# Patient Record
Sex: MALE | Race: WHITE | NOT HISPANIC OR LATINO
[De-identification: names, ages, dates, MRNs, and addresses within clinical notes are randomized per-mention and may not be internally consistent; named-entity substitution may affect disease eponyms.]

---

## 2017-06-06 ENCOUNTER — RX RENEWAL (OUTPATIENT)
Age: 62
End: 2017-06-06

## 2017-07-26 ENCOUNTER — NON-APPOINTMENT (OUTPATIENT)
Age: 62
End: 2017-07-26

## 2017-07-26 ENCOUNTER — APPOINTMENT (OUTPATIENT)
Dept: FAMILY MEDICINE | Facility: CLINIC | Age: 62
End: 2017-07-26

## 2017-07-26 VITALS
WEIGHT: 218.38 LBS | SYSTOLIC BLOOD PRESSURE: 120 MMHG | BODY MASS INDEX: 31.26 KG/M2 | DIASTOLIC BLOOD PRESSURE: 80 MMHG | HEIGHT: 70 IN

## 2017-07-26 VITALS — DIASTOLIC BLOOD PRESSURE: 90 MMHG | RESPIRATION RATE: 16 BRPM | SYSTOLIC BLOOD PRESSURE: 120 MMHG | HEART RATE: 72 BPM

## 2017-07-26 DIAGNOSIS — F40.243 FEAR OF FLYING: ICD-10-CM

## 2017-07-27 LAB
ALBUMIN SERPL ELPH-MCNC: 4.2 G/DL
ALP BLD-CCNC: 74 U/L
ALT SERPL-CCNC: 7 U/L
ANION GAP SERPL CALC-SCNC: 16 MMOL/L
APPEARANCE: ABNORMAL
AST SERPL-CCNC: 17 U/L
BACTERIA: ABNORMAL
BASOPHILS # BLD AUTO: 0.04 K/UL
BASOPHILS NFR BLD AUTO: 0.4 %
BILIRUB SERPL-MCNC: 0.6 MG/DL
BILIRUBIN URINE: ABNORMAL
BLOOD URINE: ABNORMAL
BUN SERPL-MCNC: 19 MG/DL
CALCIUM OXALATE CRYSTALS: ABNORMAL
CALCIUM SERPL-MCNC: 10.1 MG/DL
CHLORIDE SERPL-SCNC: 103 MMOL/L
CHOLEST SERPL-MCNC: 127 MG/DL
CHOLEST/HDLC SERPL: 3.5 RATIO
CO2 SERPL-SCNC: 23 MMOL/L
COLOR: ABNORMAL
CREAT SERPL-MCNC: 1.18 MG/DL
EOSINOPHIL # BLD AUTO: 0.14 K/UL
EOSINOPHIL NFR BLD AUTO: 1.4 %
GLUCOSE QUALITATIVE U: NORMAL MG/DL
GLUCOSE SERPL-MCNC: 87 MG/DL
HCT VFR BLD CALC: 47.9 %
HDLC SERPL-MCNC: 36 MG/DL
HGB BLD-MCNC: 15.7 G/DL
HYALINE CASTS: 0 /LPF
IMM GRANULOCYTES NFR BLD AUTO: 0.2 %
KETONES URINE: NEGATIVE
LDLC SERPL CALC-MCNC: 67 MG/DL
LEUKOCYTE ESTERASE URINE: NEGATIVE
LYMPHOCYTES # BLD AUTO: 2.97 K/UL
LYMPHOCYTES NFR BLD AUTO: 30.7 %
MAN DIFF?: NORMAL
MCHC RBC-ENTMCNC: 30.9 PG
MCHC RBC-ENTMCNC: 32.8 GM/DL
MCV RBC AUTO: 94.3 FL
MICROSCOPIC-UA: NORMAL
MONOCYTES # BLD AUTO: 0.68 K/UL
MONOCYTES NFR BLD AUTO: 7 %
NEUTROPHILS # BLD AUTO: 5.81 K/UL
NEUTROPHILS NFR BLD AUTO: 60.3 %
NITRITE URINE: NEGATIVE
PH URINE: 5.5
PLATELET # BLD AUTO: 257 K/UL
POTASSIUM SERPL-SCNC: 4.2 MMOL/L
PROT SERPL-MCNC: 7.5 G/DL
PROTEIN URINE: 30 MG/DL
PSA SERPL-MCNC: 1.12 NG/ML
RBC # BLD: 5.08 M/UL
RBC # FLD: 13.1 %
RED BLOOD CELLS URINE: 5 /HPF
SODIUM SERPL-SCNC: 142 MMOL/L
SPECIFIC GRAVITY URINE: 1.03
SQUAMOUS EPITHELIAL CELLS: 3 /HPF
T4 SERPL-MCNC: 6.6 UG/DL
TRIGL SERPL-MCNC: 118 MG/DL
TSH SERPL-ACNC: 1.19 UIU/ML
URATE SERPL-MCNC: 4.6 MG/DL
URINE COMMENTS: NORMAL
UROBILINOGEN URINE: NORMAL MG/DL
WBC # FLD AUTO: 9.66 K/UL
WHITE BLOOD CELLS URINE: 2 /HPF

## 2018-02-07 ENCOUNTER — APPOINTMENT (OUTPATIENT)
Dept: FAMILY MEDICINE | Facility: CLINIC | Age: 63
End: 2018-02-07
Payer: COMMERCIAL

## 2018-02-07 VITALS — SYSTOLIC BLOOD PRESSURE: 90 MMHG | RESPIRATION RATE: 1 BRPM | HEART RATE: 72 BPM | DIASTOLIC BLOOD PRESSURE: 60 MMHG

## 2018-02-07 VITALS
BODY MASS INDEX: 31.21 KG/M2 | HEIGHT: 70 IN | SYSTOLIC BLOOD PRESSURE: 108 MMHG | DIASTOLIC BLOOD PRESSURE: 62 MMHG | TEMPERATURE: 100.7 F | WEIGHT: 218 LBS

## 2018-02-07 DIAGNOSIS — Z00.00 ENCOUNTER FOR GENERAL ADULT MEDICAL EXAMINATION W/OUT ABNORMAL FINDINGS: ICD-10-CM

## 2018-02-07 DIAGNOSIS — Z87.09 PERSONAL HISTORY OF OTHER DISEASES OF THE RESPIRATORY SYSTEM: ICD-10-CM

## 2018-02-07 PROCEDURE — 99214 OFFICE O/P EST MOD 30 MIN: CPT

## 2018-08-12 ENCOUNTER — FORM ENCOUNTER (OUTPATIENT)
Age: 63
End: 2018-08-12

## 2018-08-13 ENCOUNTER — APPOINTMENT (OUTPATIENT)
Dept: FAMILY MEDICINE | Facility: CLINIC | Age: 63
End: 2018-08-13
Payer: COMMERCIAL

## 2018-08-13 ENCOUNTER — RX RENEWAL (OUTPATIENT)
Age: 63
End: 2018-08-13

## 2018-08-13 ENCOUNTER — OUTPATIENT (OUTPATIENT)
Dept: OUTPATIENT SERVICES | Facility: HOSPITAL | Age: 63
LOS: 1 days | End: 2018-08-13
Payer: COMMERCIAL

## 2018-08-13 ENCOUNTER — APPOINTMENT (OUTPATIENT)
Dept: RADIOLOGY | Facility: CLINIC | Age: 63
End: 2018-08-13
Payer: COMMERCIAL

## 2018-08-13 VITALS
SYSTOLIC BLOOD PRESSURE: 120 MMHG | WEIGHT: 218 LBS | DIASTOLIC BLOOD PRESSURE: 80 MMHG | BODY MASS INDEX: 31.21 KG/M2 | HEIGHT: 70 IN

## 2018-08-13 DIAGNOSIS — Z00.8 ENCOUNTER FOR OTHER GENERAL EXAMINATION: ICD-10-CM

## 2018-08-13 DIAGNOSIS — M79.671 PAIN IN RIGHT FOOT: ICD-10-CM

## 2018-08-13 PROCEDURE — 73630 X-RAY EXAM OF FOOT: CPT

## 2018-08-13 PROCEDURE — 73610 X-RAY EXAM OF ANKLE: CPT | Mod: 26,RT

## 2018-08-13 PROCEDURE — 73610 X-RAY EXAM OF ANKLE: CPT

## 2018-08-13 PROCEDURE — 73630 X-RAY EXAM OF FOOT: CPT | Mod: 26,RT

## 2018-08-13 PROCEDURE — 99213 OFFICE O/P EST LOW 20 MIN: CPT

## 2018-09-06 ENCOUNTER — APPOINTMENT (OUTPATIENT)
Dept: FAMILY MEDICINE | Facility: CLINIC | Age: 63
End: 2018-09-06

## 2018-10-11 ENCOUNTER — RX RENEWAL (OUTPATIENT)
Age: 63
End: 2018-10-11

## 2018-11-05 ENCOUNTER — APPOINTMENT (OUTPATIENT)
Dept: FAMILY MEDICINE | Facility: CLINIC | Age: 63
End: 2018-11-05
Payer: COMMERCIAL

## 2018-11-05 VITALS — DIASTOLIC BLOOD PRESSURE: 70 MMHG | RESPIRATION RATE: 16 BRPM | SYSTOLIC BLOOD PRESSURE: 92 MMHG | HEART RATE: 72 BPM

## 2018-11-05 VITALS
HEART RATE: 90 BPM | OXYGEN SATURATION: 98 % | TEMPERATURE: 99.5 F | SYSTOLIC BLOOD PRESSURE: 100 MMHG | WEIGHT: 220 LBS | RESPIRATION RATE: 16 BRPM | HEIGHT: 70 IN | DIASTOLIC BLOOD PRESSURE: 70 MMHG | BODY MASS INDEX: 31.5 KG/M2

## 2018-11-05 DIAGNOSIS — Z87.09 PERSONAL HISTORY OF OTHER DISEASES OF THE RESPIRATORY SYSTEM: ICD-10-CM

## 2018-11-05 LAB — S PYO AG SPEC QL IA: NEGATIVE

## 2018-11-05 PROCEDURE — 87880 STREP A ASSAY W/OPTIC: CPT | Mod: QW

## 2018-11-05 PROCEDURE — 99214 OFFICE O/P EST MOD 30 MIN: CPT | Mod: 25

## 2018-11-05 RX ORDER — OSELTAMIVIR PHOSPHATE 75 MG/1
75 CAPSULE ORAL TWICE DAILY
Qty: 10 | Refills: 0 | Status: COMPLETED | COMMUNITY
Start: 2018-02-07 | End: 2018-11-05

## 2018-11-05 NOTE — ASSESSMENT
[FreeTextEntry1] : symptomatic  treatment  fluids  aspirin tylenol advill  notify office  if  symptoms gets  worse  or  temp over  102\par check labs  for htn and hld

## 2018-11-05 NOTE — PHYSICAL EXAM
[No Acute Distress] : no acute distress [PERRL] : pupils equal round and reactive to light [No Lymphadenopathy] : no lymphadenopathy [Clear to Auscultation] : lungs were clear to auscultation bilaterally [Regular Rhythm] : with a regular rhythm [No Edema] : there was no peripheral edema [Soft] : abdomen soft [Non Tender] : non-tender [No HSM] : no HSM [No Joint Swelling] : no joint swelling [No Rash] : no rash [Normal Gait] : normal gait [de-identified] : throat  red

## 2018-11-05 NOTE — HISTORY OF PRESENT ILLNESS
[Cold Symptoms] : cold symptoms [Sore Throat] : sore throat [Earache (R)] : pain in right ear [Moderate] : moderate [Constant] : constant [Improving] : improving [FreeTextEntry2] : hoarse  difficulty swallowing  [FreeTextEntry8] : taking  bp  meds  dos not  check bp but  bp at goal

## 2018-11-06 LAB
ALBUMIN SERPL ELPH-MCNC: 4 G/DL
ALP BLD-CCNC: 80 U/L
ALT SERPL-CCNC: 11 U/L
ANION GAP SERPL CALC-SCNC: 14 MMOL/L
APPEARANCE: ABNORMAL
AST SERPL-CCNC: 13 U/L
BACTERIA: NEGATIVE
BASOPHILS # BLD AUTO: 0.03 K/UL
BASOPHILS NFR BLD AUTO: 0.2 %
BILIRUB SERPL-MCNC: 0.6 MG/DL
BILIRUBIN URINE: ABNORMAL
BLOOD URINE: NEGATIVE
BUN SERPL-MCNC: 17 MG/DL
CALCIUM SERPL-MCNC: 9.9 MG/DL
CHLORIDE SERPL-SCNC: 104 MMOL/L
CHOLEST SERPL-MCNC: 124 MG/DL
CHOLEST/HDLC SERPL: 3.9 RATIO
CO2 SERPL-SCNC: 24 MMOL/L
COLOR: ABNORMAL
CREAT SERPL-MCNC: 1.11 MG/DL
EOSINOPHIL # BLD AUTO: 0.15 K/UL
EOSINOPHIL NFR BLD AUTO: 1 %
GLUCOSE QUALITATIVE U: NEGATIVE MG/DL
GLUCOSE SERPL-MCNC: 98 MG/DL
HCT VFR BLD CALC: 47.6 %
HDLC SERPL-MCNC: 32 MG/DL
HGB BLD-MCNC: 15.4 G/DL
HYALINE CASTS: 0 /LPF
IMM GRANULOCYTES NFR BLD AUTO: 0.2 %
KETONES URINE: ABNORMAL
LDLC SERPL CALC-MCNC: 61 MG/DL
LEUKOCYTE ESTERASE URINE: NEGATIVE
LYMPHOCYTES # BLD AUTO: 2.48 K/UL
LYMPHOCYTES NFR BLD AUTO: 17.2 %
MAN DIFF?: NORMAL
MCHC RBC-ENTMCNC: 30.9 PG
MCHC RBC-ENTMCNC: 32.4 GM/DL
MCV RBC AUTO: 95.6 FL
MICROSCOPIC-UA: NORMAL
MONOCYTES # BLD AUTO: 1.16 K/UL
MONOCYTES NFR BLD AUTO: 8 %
NEUTROPHILS # BLD AUTO: 10.61 K/UL
NEUTROPHILS NFR BLD AUTO: 73.4 %
NITRITE URINE: NEGATIVE
PH URINE: 5
PLATELET # BLD AUTO: 235 K/UL
POTASSIUM SERPL-SCNC: 4.4 MMOL/L
PROT SERPL-MCNC: 7.3 G/DL
PROTEIN URINE: 30 MG/DL
RBC # BLD: 4.98 M/UL
RBC # FLD: 13.9 %
RED BLOOD CELLS URINE: 4 /HPF
SODIUM SERPL-SCNC: 141 MMOL/L
SPECIFIC GRAVITY URINE: 1.03
SQUAMOUS EPITHELIAL CELLS: 2 /HPF
T4 SERPL-MCNC: 6.3 UG/DL
TRIGL SERPL-MCNC: 157 MG/DL
TSH SERPL-ACNC: 1.59 UIU/ML
URATE SERPL-MCNC: 4.3 MG/DL
UROBILINOGEN URINE: 1 MG/DL
WBC # FLD AUTO: 14.46 K/UL
WHITE BLOOD CELLS URINE: 2 /HPF

## 2019-04-02 ENCOUNTER — APPOINTMENT (OUTPATIENT)
Dept: FAMILY MEDICINE | Facility: CLINIC | Age: 64
End: 2019-04-02
Payer: COMMERCIAL

## 2019-04-02 VITALS — SYSTOLIC BLOOD PRESSURE: 120 MMHG | RESPIRATION RATE: 16 BRPM | DIASTOLIC BLOOD PRESSURE: 80 MMHG | HEART RATE: 72 BPM

## 2019-04-02 VITALS
WEIGHT: 235.5 LBS | BODY MASS INDEX: 33.71 KG/M2 | HEART RATE: 65 BPM | OXYGEN SATURATION: 96 % | DIASTOLIC BLOOD PRESSURE: 68 MMHG | HEIGHT: 70 IN | SYSTOLIC BLOOD PRESSURE: 108 MMHG

## 2019-04-02 PROCEDURE — 99213 OFFICE O/P EST LOW 20 MIN: CPT

## 2019-04-02 RX ORDER — DIAZEPAM 5 MG/1
5 TABLET ORAL
Qty: 10 | Refills: 0 | Status: COMPLETED | COMMUNITY
Start: 2017-07-26 | End: 2019-04-02

## 2019-04-02 RX ORDER — ALPRAZOLAM 0.25 MG/1
0.25 TABLET ORAL
Qty: 10 | Refills: 0 | Status: COMPLETED | COMMUNITY
Start: 2018-08-13 | End: 2019-04-02

## 2019-04-02 NOTE — HISTORY OF PRESENT ILLNESS
[FreeTextEntry1] : pt here  for management of  htn  [de-identified] : feeling well taking  all meds  not  checking  bp

## 2019-04-02 NOTE — PHYSICAL EXAM
[No Acute Distress] : no acute distress [PERRL] : pupils equal round and reactive to light [No Lymphadenopathy] : no lymphadenopathy [Clear to Auscultation] : lungs were clear to auscultation bilaterally [Regular Rhythm] : with a regular rhythm [No Edema] : there was no peripheral edema [Soft] : abdomen soft [Non Tender] : non-tender [No HSM] : no HSM [No Joint Swelling] : no joint swelling [No Rash] : no rash [Normal Gait] : normal gait [de-identified] : occ  ectopy

## 2020-04-05 ENCOUNTER — INPATIENT (INPATIENT)
Facility: HOSPITAL | Age: 65
LOS: 2 days | Discharge: HOME | DRG: 177 | End: 2020-04-08
Attending: INTERNAL MEDICINE | Admitting: INTERNAL MEDICINE
Payer: COMMERCIAL

## 2020-04-05 VITALS
DIASTOLIC BLOOD PRESSURE: 56 MMHG | TEMPERATURE: 101 F | OXYGEN SATURATION: 91 % | HEART RATE: 90 BPM | HEIGHT: 70 IN | SYSTOLIC BLOOD PRESSURE: 102 MMHG | WEIGHT: 229.94 LBS | RESPIRATION RATE: 22 BRPM

## 2020-04-05 DIAGNOSIS — U07.1 COVID-19: ICD-10-CM

## 2020-04-05 LAB
ALBUMIN SERPL ELPH-MCNC: 3.3 G/DL — SIGNIFICANT CHANGE UP (ref 3.3–5)
ALP SERPL-CCNC: 67 U/L — SIGNIFICANT CHANGE UP (ref 30–120)
ALT FLD-CCNC: 39 U/L DA — SIGNIFICANT CHANGE UP (ref 10–60)
ANION GAP SERPL CALC-SCNC: 8 MMOL/L — SIGNIFICANT CHANGE UP (ref 5–17)
AST SERPL-CCNC: 37 U/L — SIGNIFICANT CHANGE UP (ref 10–40)
BASOPHILS # BLD AUTO: 0.01 K/UL — SIGNIFICANT CHANGE UP (ref 0–0.2)
BASOPHILS NFR BLD AUTO: 0.1 % — SIGNIFICANT CHANGE UP (ref 0–2)
BILIRUB SERPL-MCNC: 0.9 MG/DL — SIGNIFICANT CHANGE UP (ref 0.2–1.2)
BUN SERPL-MCNC: 17 MG/DL — SIGNIFICANT CHANGE UP (ref 7–23)
CALCIUM SERPL-MCNC: 8.4 MG/DL — SIGNIFICANT CHANGE UP (ref 8.4–10.5)
CHLORIDE SERPL-SCNC: 96 MMOL/L — SIGNIFICANT CHANGE UP (ref 96–108)
CO2 SERPL-SCNC: 28 MMOL/L — SIGNIFICANT CHANGE UP (ref 22–31)
CREAT SERPL-MCNC: 1.05 MG/DL — SIGNIFICANT CHANGE UP (ref 0.5–1.3)
EOSINOPHIL # BLD AUTO: 0 K/UL — SIGNIFICANT CHANGE UP (ref 0–0.5)
EOSINOPHIL NFR BLD AUTO: 0 % — SIGNIFICANT CHANGE UP (ref 0–6)
GLUCOSE SERPL-MCNC: 119 MG/DL — HIGH (ref 70–99)
HCT VFR BLD CALC: 47.7 % — SIGNIFICANT CHANGE UP (ref 39–50)
HGB BLD-MCNC: 16 G/DL — SIGNIFICANT CHANGE UP (ref 13–17)
IMM GRANULOCYTES NFR BLD AUTO: 0.6 % — SIGNIFICANT CHANGE UP (ref 0–1.5)
LIDOCAIN IGE QN: 232 U/L — SIGNIFICANT CHANGE UP (ref 73–393)
LYMPHOCYTES # BLD AUTO: 0.83 K/UL — LOW (ref 1–3.3)
LYMPHOCYTES # BLD AUTO: 9.7 % — LOW (ref 13–44)
MCHC RBC-ENTMCNC: 30.6 PG — SIGNIFICANT CHANGE UP (ref 27–34)
MCHC RBC-ENTMCNC: 33.5 GM/DL — SIGNIFICANT CHANGE UP (ref 32–36)
MCV RBC AUTO: 91.2 FL — SIGNIFICANT CHANGE UP (ref 80–100)
MONOCYTES # BLD AUTO: 0.41 K/UL — SIGNIFICANT CHANGE UP (ref 0–0.9)
MONOCYTES NFR BLD AUTO: 4.8 % — SIGNIFICANT CHANGE UP (ref 2–14)
NEUTROPHILS # BLD AUTO: 7.22 K/UL — SIGNIFICANT CHANGE UP (ref 1.8–7.4)
NEUTROPHILS NFR BLD AUTO: 84.8 % — HIGH (ref 43–77)
NRBC # BLD: 0 /100 WBCS — SIGNIFICANT CHANGE UP (ref 0–0)
PLATELET # BLD AUTO: 179 K/UL — SIGNIFICANT CHANGE UP (ref 150–400)
POTASSIUM SERPL-MCNC: 3.5 MMOL/L — SIGNIFICANT CHANGE UP (ref 3.5–5.3)
POTASSIUM SERPL-SCNC: 3.5 MMOL/L — SIGNIFICANT CHANGE UP (ref 3.5–5.3)
PROT SERPL-MCNC: 7.1 G/DL — SIGNIFICANT CHANGE UP (ref 6–8.3)
RBC # BLD: 5.23 M/UL — SIGNIFICANT CHANGE UP (ref 4.2–5.8)
RBC # FLD: 12.5 % — SIGNIFICANT CHANGE UP (ref 10.3–14.5)
SODIUM SERPL-SCNC: 132 MMOL/L — LOW (ref 135–145)
WBC # BLD: 8.52 K/UL — SIGNIFICANT CHANGE UP (ref 3.8–10.5)
WBC # FLD AUTO: 8.52 K/UL — SIGNIFICANT CHANGE UP (ref 3.8–10.5)

## 2020-04-05 PROCEDURE — 99285 EMERGENCY DEPT VISIT HI MDM: CPT

## 2020-04-05 PROCEDURE — 71045 X-RAY EXAM CHEST 1 VIEW: CPT | Mod: 26

## 2020-04-05 RX ORDER — ONDANSETRON 8 MG/1
4 TABLET, FILM COATED ORAL EVERY 6 HOURS
Refills: 0 | Status: DISCONTINUED | OUTPATIENT
Start: 2020-04-05 | End: 2020-04-08

## 2020-04-05 RX ORDER — ACETAMINOPHEN 500 MG
650 TABLET ORAL ONCE
Refills: 0 | Status: COMPLETED | OUTPATIENT
Start: 2020-04-05 | End: 2020-04-05

## 2020-04-05 RX ORDER — ONDANSETRON 8 MG/1
4 TABLET, FILM COATED ORAL ONCE
Refills: 0 | Status: COMPLETED | OUTPATIENT
Start: 2020-04-05 | End: 2020-04-05

## 2020-04-05 RX ORDER — ASCORBIC ACID 60 MG
1000 TABLET,CHEWABLE ORAL DAILY
Refills: 0 | Status: DISCONTINUED | OUTPATIENT
Start: 2020-04-05 | End: 2020-04-08

## 2020-04-05 RX ORDER — METOPROLOL TARTRATE 50 MG
25 TABLET ORAL
Refills: 0 | Status: DISCONTINUED | OUTPATIENT
Start: 2020-04-05 | End: 2020-04-06

## 2020-04-05 RX ORDER — ASPIRIN/CALCIUM CARB/MAGNESIUM 324 MG
1 TABLET ORAL
Qty: 0 | Refills: 0 | DISCHARGE

## 2020-04-05 RX ORDER — HYDROXYCHLOROQUINE SULFATE 200 MG
400 TABLET ORAL EVERY 12 HOURS
Refills: 0 | Status: COMPLETED | OUTPATIENT
Start: 2020-04-06 | End: 2020-04-06

## 2020-04-05 RX ORDER — HEPARIN SODIUM 5000 [USP'U]/ML
5000 INJECTION INTRAVENOUS; SUBCUTANEOUS EVERY 8 HOURS
Refills: 0 | Status: DISCONTINUED | OUTPATIENT
Start: 2020-04-05 | End: 2020-04-06

## 2020-04-05 RX ORDER — METOPROLOL TARTRATE 50 MG
1 TABLET ORAL
Qty: 0 | Refills: 0 | DISCHARGE

## 2020-04-05 RX ORDER — HYDROXYCHLOROQUINE SULFATE 200 MG
200 TABLET ORAL EVERY 12 HOURS
Refills: 0 | Status: DISCONTINUED | OUTPATIENT
Start: 2020-04-07 | End: 2020-04-08

## 2020-04-05 RX ORDER — ATORVASTATIN CALCIUM 80 MG/1
20 TABLET, FILM COATED ORAL AT BEDTIME
Refills: 0 | Status: DISCONTINUED | OUTPATIENT
Start: 2020-04-05 | End: 2020-04-08

## 2020-04-05 RX ORDER — SODIUM CHLORIDE 9 MG/ML
1000 INJECTION INTRAMUSCULAR; INTRAVENOUS; SUBCUTANEOUS ONCE
Refills: 0 | Status: COMPLETED | OUTPATIENT
Start: 2020-04-05 | End: 2020-04-05

## 2020-04-05 RX ORDER — LOVASTATIN 20 MG
2 TABLET ORAL
Qty: 0 | Refills: 0 | DISCHARGE

## 2020-04-05 RX ORDER — ACETAMINOPHEN 500 MG
650 TABLET ORAL EVERY 6 HOURS
Refills: 0 | Status: DISCONTINUED | OUTPATIENT
Start: 2020-04-05 | End: 2020-04-08

## 2020-04-05 RX ORDER — ASPIRIN/CALCIUM CARB/MAGNESIUM 324 MG
81 TABLET ORAL DAILY
Refills: 0 | Status: DISCONTINUED | OUTPATIENT
Start: 2020-04-05 | End: 2020-04-08

## 2020-04-05 RX ORDER — HYDROXYCHLOROQUINE SULFATE 200 MG
TABLET ORAL
Refills: 0 | Status: DISCONTINUED | OUTPATIENT
Start: 2020-04-05 | End: 2020-04-08

## 2020-04-05 RX ADMIN — Medication 650 MILLIGRAM(S): at 18:51

## 2020-04-05 RX ADMIN — SODIUM CHLORIDE 1000 MILLILITER(S): 9 INJECTION INTRAMUSCULAR; INTRAVENOUS; SUBCUTANEOUS at 19:30

## 2020-04-05 RX ADMIN — ONDANSETRON 4 MILLIGRAM(S): 8 TABLET, FILM COATED ORAL at 18:51

## 2020-04-05 RX ADMIN — SODIUM CHLORIDE 1000 MILLILITER(S): 9 INJECTION INTRAMUSCULAR; INTRAVENOUS; SUBCUTANEOUS at 20:00

## 2020-04-05 RX ADMIN — SODIUM CHLORIDE 1000 MILLILITER(S): 9 INJECTION INTRAMUSCULAR; INTRAVENOUS; SUBCUTANEOUS at 18:52

## 2020-04-05 NOTE — ED ADULT NURSE REASSESSMENT NOTE - NS ED NURSE REASSESS COMMENT FT1
pt resting comfortably, on RA, spo2 92-94% , c/o generalized weakness, IVF in progress, pending disposition. Tobacco Use- non smoker  Alcohol- Drinks vodka regularly, recently been drinking 5-10 glasses every other day  Drug Abuse- none

## 2020-04-05 NOTE — ED PROVIDER NOTE - PROGRESS NOTE DETAILS
pt still receiving fluids, over 1L still to go, will reassess after that, expect discharge, pt maintaining O2Sat mid 90s on room air pt still receiving fluids, over 1L still to go, will reassess after that, expect discharge, pt maintaining O2Sat mid 90s on room air at rest after fluids walked patient to reassess - pt almost fell, feeling very weak still, O2 to 85% after walking - pt agrees to admisison

## 2020-04-05 NOTE — ED ADULT NURSE NOTE - OBJECTIVE STATEMENT
65 y/o male bibems aox3 via stretcher c/o generalized weakness and dyspnea on exertion. pt states he was tested positive for covid19 on 3/28 and has been feeling worse over the last 3 days. pt reports nausea and dry heaving but no vomiting.

## 2020-04-05 NOTE — ED PROVIDER NOTE - CARE PLAN
Principal Discharge DX:	Infection due to [facility] coronavirus  Secondary Diagnosis:	Fever  Secondary Diagnosis:	SOB (shortness of breath) Principal Discharge DX:	Infection due to [facility] coronavirus  Secondary Diagnosis:	Fever  Secondary Diagnosis:	SOB (shortness of breath)  Secondary Diagnosis:	Hypoxia

## 2020-04-05 NOTE — CONSULT NOTE ADULT - ASSESSMENT
63 y/o male with PMH of HTN, HLD came to the ED complaining of difficulty breathing and fatigue. Patient said his symptoms has been going on for 21 days now, he was seen at [facility] MD was tested for CoVID-19, got a call on 3/26/2020 that he was positive. He has been self isolating since then. He noted still having fever, nausea, fatigue and decrease appetite. Shortness of breath is mainly with exertion.    In the ED, he received 2L of IVF, febrile and hypoxic to 85% RA with ambulation. CXR: b/l LL opacities.       Hypoxia 2/2 CoVID-19 pneumonia   Admit to medical floor with    ESR, CRP, LDH, ferritin ordered   CoVID-19 positive at outside facility   Will start Plaquenil   Tylenol PRN   Vitamin C daily   Maintain isolation protocol   Prone positioning every hour for 15 minutes at least as tolerated     HTN/HLD   Metoprolol 25mg bid with holding parameters   Lovastatin 80mg   Monitor BP     Supportive   DVT prophylaxis: heparin sc   Diet: DASH   Asprin 81mg (home medication)       Care plan discussed with Dr. Edgar

## 2020-04-05 NOTE — ED ADULT NURSE REASSESSMENT NOTE - NS ED NURSE REASSESS COMMENT FT1
pt received from previous RN, pt resting in stretcher, on RA ,spo2 93%, bed in low position, call bell within reach, plan of care discussed, will monitor.

## 2020-04-05 NOTE — ED PROVIDER NOTE - OBJECTIVE STATEMENT
65 y/o male with no pertinent PMHx HTN and high cholesterol, presents to the ED c/o fever, SOB, fatigue, decreased appetite, and fatigue for 3 weeks. Was tested on 3/26 for COVID 19 and was found to be positive. Came in today for continued sx. Denies weakness, abd pain, or chest pain. Nonsmoker. PMD: [place]. No other complaints at this time. 63 y/o male with a PMHx of HTN and high cholesterol, presents to the ED c/o fever, SOB, fatigue, decreased appetite, and fatigue for 3 weeks. Was tested on 3/26 for COVID 19 and was found to be positive. Came in today for continued sx. Denies weakness, abd pain, or chest pain. Nonsmoker. PMD: [place]. No other complaints at this time. 63 y/o male with a PMHx of HTN and high cholesterol, presents to the ED c/o fever, SOB, decreased appetite, and fatigue for 3 weeks. Was tested on 3/26 for COVID 19 and was found to be positive. Came in today for continued sx. Denies focal weakness, abd pain, diarrhea, or chest pain. Nonsmoker. PMD: [place]. No other complaints at this time.

## 2020-04-05 NOTE — ED ADULT NURSE NOTE - NSIMPLEMENTINTERV_GEN_ALL_ED
Implemented All Universal Safety Interventions:  [place] to call system. Call bell, personal items and telephone within reach. Instruct patient to call for assistance. Room bathroom lighting operational. Non-slip footwear when patient is off stretcher. Physically safe environment: no spills, clutter or unnecessary equipment. Stretcher in lowest position, wheels locked, appropriate side rails in place.

## 2020-04-05 NOTE — ED PROVIDER NOTE - CLINICAL SUMMARY MEDICAL DECISION MAKING FREE TEXT BOX
Patient who is diagnosed with COVID on 3/26. Plan: labs, chest XR, iv fluids, and zofran. Patient who is diagnosed with COVID on 3/26. Plan: labs, chest XR, iv fluids, tylenol and zofran.

## 2020-04-06 DIAGNOSIS — U07.1 COVID-19: ICD-10-CM

## 2020-04-06 DIAGNOSIS — E87.1 HYPO-OSMOLALITY AND HYPONATREMIA: ICD-10-CM

## 2020-04-06 DIAGNOSIS — R09.02 HYPOXEMIA: ICD-10-CM

## 2020-04-06 DIAGNOSIS — E78.5 HYPERLIPIDEMIA, UNSPECIFIED: ICD-10-CM

## 2020-04-06 DIAGNOSIS — Z29.9 ENCOUNTER FOR PROPHYLACTIC MEASURES, UNSPECIFIED: ICD-10-CM

## 2020-04-06 DIAGNOSIS — I10 ESSENTIAL (PRIMARY) HYPERTENSION: ICD-10-CM

## 2020-04-06 LAB
BASOPHILS # BLD AUTO: 0.02 K/UL — SIGNIFICANT CHANGE UP (ref 0–0.2)
BASOPHILS NFR BLD AUTO: 0.2 % — SIGNIFICANT CHANGE UP (ref 0–2)
CRP SERPL-MCNC: 3.13 MG/DL — HIGH (ref 0–0.4)
EOSINOPHIL # BLD AUTO: 0 K/UL — SIGNIFICANT CHANGE UP (ref 0–0.5)
EOSINOPHIL NFR BLD AUTO: 0 % — SIGNIFICANT CHANGE UP (ref 0–6)
ERYTHROCYTE [SEDIMENTATION RATE] IN BLOOD: 16 MM/HR — HIGH (ref 0–9)
FERRITIN SERPL-MCNC: 1672 NG/ML — HIGH (ref 30–400)
HCT VFR BLD CALC: 48.3 % — SIGNIFICANT CHANGE UP (ref 39–50)
HGB BLD-MCNC: 16 G/DL — SIGNIFICANT CHANGE UP (ref 13–17)
IMM GRANULOCYTES NFR BLD AUTO: 1 % — SIGNIFICANT CHANGE UP (ref 0–1.5)
LDH SERPL L TO P-CCNC: 345 U/L — HIGH (ref 50–242)
LYMPHOCYTES # BLD AUTO: 0.9 K/UL — LOW (ref 1–3.3)
LYMPHOCYTES # BLD AUTO: 10.7 % — LOW (ref 13–44)
MCHC RBC-ENTMCNC: 30.6 PG — SIGNIFICANT CHANGE UP (ref 27–34)
MCHC RBC-ENTMCNC: 33.1 GM/DL — SIGNIFICANT CHANGE UP (ref 32–36)
MCV RBC AUTO: 92.4 FL — SIGNIFICANT CHANGE UP (ref 80–100)
MONOCYTES # BLD AUTO: 0.39 K/UL — SIGNIFICANT CHANGE UP (ref 0–0.9)
MONOCYTES NFR BLD AUTO: 4.6 % — SIGNIFICANT CHANGE UP (ref 2–14)
NEUTROPHILS # BLD AUTO: 7.02 K/UL — SIGNIFICANT CHANGE UP (ref 1.8–7.4)
NEUTROPHILS NFR BLD AUTO: 83.5 % — HIGH (ref 43–77)
NRBC # BLD: 0 /100 WBCS — SIGNIFICANT CHANGE UP (ref 0–0)
PLATELET # BLD AUTO: 199 K/UL — SIGNIFICANT CHANGE UP (ref 150–400)
RBC # BLD: 5.23 M/UL — SIGNIFICANT CHANGE UP (ref 4.2–5.8)
RBC # FLD: 13 % — SIGNIFICANT CHANGE UP (ref 10.3–14.5)
WBC # BLD: 8.41 K/UL — SIGNIFICANT CHANGE UP (ref 3.8–10.5)
WBC # FLD AUTO: 8.41 K/UL — SIGNIFICANT CHANGE UP (ref 3.8–10.5)

## 2020-04-06 PROCEDURE — 99223 1ST HOSP IP/OBS HIGH 75: CPT

## 2020-04-06 RX ORDER — METOPROLOL TARTRATE 50 MG
50 TABLET ORAL
Refills: 0 | Status: DISCONTINUED | OUTPATIENT
Start: 2020-04-07 | End: 2020-04-08

## 2020-04-06 RX ORDER — ENOXAPARIN SODIUM 100 MG/ML
40 INJECTION SUBCUTANEOUS EVERY 12 HOURS
Refills: 0 | Status: DISCONTINUED | OUTPATIENT
Start: 2020-04-06 | End: 2020-04-08

## 2020-04-06 RX ADMIN — HEPARIN SODIUM 5000 UNIT(S): 5000 INJECTION INTRAVENOUS; SUBCUTANEOUS at 05:29

## 2020-04-06 RX ADMIN — Medication 200 MILLIGRAM(S): at 23:45

## 2020-04-06 RX ADMIN — Medication 1000 MILLIGRAM(S): at 12:32

## 2020-04-06 RX ADMIN — HEPARIN SODIUM 5000 UNIT(S): 5000 INJECTION INTRAVENOUS; SUBCUTANEOUS at 12:34

## 2020-04-06 RX ADMIN — Medication 25 MILLIGRAM(S): at 05:29

## 2020-04-06 RX ADMIN — ENOXAPARIN SODIUM 40 MILLIGRAM(S): 100 INJECTION SUBCUTANEOUS at 21:24

## 2020-04-06 RX ADMIN — Medication 400 MILLIGRAM(S): at 00:18

## 2020-04-06 RX ADMIN — Medication 25 MILLIGRAM(S): at 18:51

## 2020-04-06 RX ADMIN — Medication 81 MILLIGRAM(S): at 12:33

## 2020-04-06 RX ADMIN — Medication 400 MILLIGRAM(S): at 12:33

## 2020-04-06 RX ADMIN — HEPARIN SODIUM 5000 UNIT(S): 5000 INJECTION INTRAVENOUS; SUBCUTANEOUS at 00:01

## 2020-04-06 RX ADMIN — Medication 125 MILLIGRAM(S): at 09:40

## 2020-04-06 RX ADMIN — Medication 40 MILLIGRAM(S): at 23:45

## 2020-04-06 RX ADMIN — ATORVASTATIN CALCIUM 20 MILLIGRAM(S): 80 TABLET, FILM COATED ORAL at 21:23

## 2020-04-06 RX ADMIN — Medication 40 MILLIGRAM(S): at 17:56

## 2020-04-06 NOTE — CONSULT NOTE ADULT - SUBJECTIVE AND OBJECTIVE BOX
History of Present Illness: The patient is a 64 year old male with a history of HTN, HL who is admitted with fevers, weakness, shortness of breath in the setting of COVID-19. He has also had nausea and decreased appetite. He tested positive as outpatient on 3/26. On telemetry, noted to have PVCs and ventricular trigeminy.    Past Medical/Surgical History:  HTN, HL     Medications:  Home Medications:  Aspir 81 oral delayed release tablet: 1 tab(s) orally once a day (05 Apr 2020 22:32)  lovastatin 40 mg oral tablet: 2 tab(s) orally once a day (05 Apr 2020 22:32)  metoprolol tartrate 50 mg oral tablet: 1 tab(s) orally 2 times a day (05 Apr 2020 22:32)      Family History: Non-contributory family history of premature cardiovascular atherosclerotic disease    Social History: No tobacco, alcohol or drug use    Review of Systems:  General: No fevers, chills, weight loss or gain  Skin: No rashes, color changes  Cardiovascular: No chest pain, orthopnea  Respiratory: No shortness of breath, cough  Gastrointestinal: No nausea, abdominal pain  Genitourinary: No incontinence, pain with urination  Musculoskeletal: No pain, swelling, decreased range of motion  Neurological: No headache, weakness  Psychiatric: No depression, anxiety  Endocrine: No weight loss or gain, increased thirst  All other systems are comprehensively negative.    Physical Exam:  Vitals:        Vital Signs Last 24 Hrs  T(C): 36.5 (06 Apr 2020 15:33), Max: 37.9 (06 Apr 2020 00:24)  T(F): 97.7 (06 Apr 2020 15:33), Max: 100.3 (06 Apr 2020 00:24)  HR: 79 (06 Apr 2020 15:33) (77 - 87)  BP: 128/78 (06 Apr 2020 15:33) (115/75 - 150/76)  BP(mean): --  RR: 18 (06 Apr 2020 15:33) (17 - 26)  SpO2: 94% (06 Apr 2020 15:33) (94% - 98%)  General: NAD  HEENT: MMM  Abdomen: S/NT/ND, +BS  Extremities: No LE edema, no cyanosis  Neuro: AAOx3, non-focal  Skin: No rash    Labs:                        16.0   8.41  )-----------( 199      ( 06 Apr 2020 11:53 )             48.3     04-05    132<L>  |  96  |  17  ----------------------------<  119<H>  3.5   |  28  |  1.05    Ca    8.4      05 Apr 2020 18:49    TPro  7.1  /  Alb  3.3  /  TBili  0.9  /  DBili  x   /  AST  37  /  ALT  39  /  AlkPhos  67  04-05            Telemetry: Sinus rhythm, PVCs, ventricular trigeminy

## 2020-04-06 NOTE — H&P ADULT - ASSESSMENT
63 y/o male with PMH of HTN, HLD came to the ED complaining of difficulty breathing and fatigue. Patient said his symptoms has been going on for 21 days now, he was seen at [facility] MD was tested for CoVID-19, got a call on 3/26/2020 that he was positive. He has been self isolating since then. He noted still having fever, nausea, fatigue and decrease appetite. Shortness of breath is mainly with exertion.    In the ED, he received 2L of IVF, febrile and hypoxic to 85% RA with ambulation. CXR: b/l LL opacities.       Hypoxia 2/2 CoVID-19 pneumonia   Admit to medical floor with    ESR, CRP, LDH, ferritin ordered   CoVID-19 positive at outside facility   Will start Plaquenil   Tylenol PRN   Vitamin C daily   Maintain isolation protocol   Prone positioning every hour for 15 minutes at least as tolerated     HTN/HLD   Metoprolol 25mg bid with holding parameters   Lovastatin 80mg   Monitor BP     Supportive   DVT prophylaxis: heparin sc   Diet: DASH   Asprin 81mg (home medication)       Care plan discussed with Dr. Edgar 63 y/o male with PMH of HTN, Dyslipidemia presented with difficulty breathing and fatigue.

## 2020-04-06 NOTE — CONSULT NOTE ADULT - SUBJECTIVE AND OBJECTIVE BOX
HPI:  65 y/o male with PMH of HTN, HLD presented with CC of SOB on exertion Poor appetite and fatigue x 3 weeks Was seen at [facility] MD was tested for CoVID-19. He got a call on 3/26/2020 that he was positive.  Did not feel better. Admitted for COVID 19 pneumonia.    Infectious Disease consult was called to evaluate pt.    Past Medical & Surgical Hx:  PAST MEDICAL & SURGICAL HISTORY:  HTN (hypertension)  High cholesterol  No significant past surgical history    Social History--  EtOH: denies   Tobacco: denies  Drug Use: denies       FAMILY HISTORY:  No pertinent family history in first degree relatives      Allergies  No Known Allergies    Intolerances  NONE    Home Medications:  Aspir 81 oral delayed release tablet: 1 tab(s) orally once a day (05 Apr 2020 22:32)  lovastatin 40 mg oral tablet: 2 tab(s) orally once a day (05 Apr 2020 22:32)  metoprolol tartrate 50 mg oral tablet: 1 tab(s) orally 2 times a day (05 Apr 2020 22:32)    Current Inpatient Medications :    ANTIBIOTICS:   hydroxychloroquine   Oral     OTHER RELEVANT MEDICATIONS :  acetaminophen   Tablet .. 650 milliGRAM(s) Oral every 6 hours PRN  ascorbic acid 1000 milliGRAM(s) Oral daily  aspirin enteric coated 81 milliGRAM(s) Oral daily  atorvastatin 20 milliGRAM(s) Oral at bedtime  heparin  Injectable 5000 Unit(s) SubCutaneous every 8 hours  methylPREDNISolone sodium succinate Injectable 40 milliGRAM(s) IV Push every 8 hours  metoprolol tartrate 25 milliGRAM(s) Oral two times a day  ondansetron Injectable 4 milliGRAM(s) IV Push every 6 hours PRN      ROS:  CONSTITUTIONAL:  Negative fever or chills  EYES:  Negative  blurry vision or double vision  CARDIOVASCULAR:  Negative for chest pain or palpitations  RESPIRATORY:  Negative for cough, wheezing, or SOB   GASTROINTESTINAL:  Negative for nausea, vomiting, diarrhea, constipation, or abdominal pain  GENITOURINARY:  Negative frequency, urgency , dysuria or hematuria   NEUROLOGIC:  No headache, confusion, dizziness, lightheadedness  All other systems were reviewed and are negative      Physical Exam:  Vital Signs Last 24 Hrs  T(C): 36.5 (06 Apr 2020 15:33), Max: 38.2 (05 Apr 2020 18:10)  T(F): 97.7 (06 Apr 2020 15:33), Max: 100.7 (05 Apr 2020 18:10)  HR: 79 (06 Apr 2020 15:33) (77 - 90)  BP: 128/78 (06 Apr 2020 15:33) (102/56 - 150/76)  BP(mean): --  RR: 18 (06 Apr 2020 15:33) (17 - 26)  SpO2: 94% (06 Apr 2020 15:33) (91% - 98%)  Height (cm): 177.8 (04-05 @ 18:10)  Weight (kg): 104.3 (04-05 @ 18:10)  BMI (kg/m2): 33 (04-05 @ 18:10)  BSA (m2): 2.21 (04-05 @ 18:10)    General: well developed well nourished, in no acute distress  Eyes: sclera anicteric, pupils equal and reactive to light  ENMT: buccal mucosa moist, pharynx not injected  Neck: supple, trachea midline  Lungs: clear, no wheeze/rhonchi  Cardiovascular: regular rate and rhythm, S1 S2  Abdomen: soft, nontender, no organomegaly present, bowel sounds normal  Neurological:  alert and oriented x3, Cranial Nerves II-XII grossly intact  Skin:no increased ecchymosis/petechiae/purpura  Lymph Nodes: no palpable cervical/supraclavicular lymph nodes enlargements  Extremities: no cyanosis/clubbing/edema    Labs:                         16.0   8.41  )-----------( 199      ( 06 Apr 2020 11:53 )             48.3   04-05    132<L>  |  96  |  17  ----------------------------<  119<H>  3.5   |  28  |  1.05    Ca    8.4      05 Apr 2020 18:49    TPro  7.1  /  Alb  3.3  /  TBili  0.9  /  DBili  x   /  AST  37  /  ALT  39  /  AlkPhos  67  04-05      RECENT CULTURES:  none      RADIOLOGY & ADDITIONAL STUDIES:    EXAM:  XR CHEST PORTABLE ROUTINE 1V                                  PROCEDURE DATE:  04/05/2020          INTERPRETATION:  CLINICAL INFORMATION:  Shortness of breath and weakness.    TECHNIQUE:  AP view of the chest.    COMPARISON:  None available.    FINDINGS:  Vague groundglass opacity at the left lung base. Hemidiaphragms are sharp; no evidence of a pleural effusion. Cardiomediastinal silhouette is exaggerated by AP technique. Degenerative changes of the spine.    IMPRESSION:    Vague groundglass opacity at the left lung base which may be infectious or inflammatory.    Assessment :   65 y/o male with PMH of HTN, HLD presented with CC of SOB on exertion Poor appetite and fatigue x 3 weeks Was seen at [facility] MD was tested for CoVID-19. He got a call on 3/26/2020 that he was positive.  Did not feel better. Admitted for COVID 19 pneumonia.      Plan :   On hydroxychloroquine  Trend temps   COVID-19---high risk period for decompensation  (7-14 days post symptom onset), avoid aerosolizing procedures, NSAIDs   -avoid excessive blood draws and frequent CXRs  -daily CBC w diff to follow eos, lymphocytes and neutrophils and daily NLR calculation (NLR <3 low vs >5 high)  -Other labs that may be selectively used to risk stratify and predict clinical course, ie: Ferritin (lower risk <450 vs >850) CRP (low risk <2 and higher risk >6) and LDH, D Dimer  -antibiotics only if there is concern for a bacterial process  -neutrophil/lymphocyte ratio 8.6  -pulm toileting  -Increase activity      Continue with present regime .  Approptiate use of antibiotics and adverse effects reviewed.      I have discussed the above plan of care with patient/family in detail. They expressed understanding of the treatment plan . Risks, benefits and alternatives discussed in detail. I have asked if they have any questions or concerns and appropriately addressed them to the best of my ability .      > 45 minutes spent in direct patient care reviewing  the notes, lab data/ imaging , discussion with multidisciplinary team. All questions were addressed and answered to the best of my capacity .    Thank you for allowing me to participate in the care of your patient .      Natalia Bruce MD  Infectious Disease  [phone]

## 2020-04-06 NOTE — H&P ADULT - HISTORY OF PRESENT ILLNESS
65 y/o male with PMH of HTN, HLD came to the ED complaining of difficulty breathing and fatigue. Patient said his symptoms has been going on for 21 days now, he was seen at [facility] MD was tested for CoVID-19, got a call on 3/26/2020 that he was positive. He has been self isolating since then. He noted still having fever, nausea, fatigue and decrease appetite. Shortness of breath is mainly with exertion. He has no orthopnea, leg edema, calf pain, cough, vomiting, abdominal pain, chest pain, palpitation.

## 2020-04-06 NOTE — H&P ADULT - NSHPLABSRESULTS_GEN_ALL_CORE
-                            16.0   8.52  )-----------( 179      ( 05 Apr 2020 18:49 )             47.7            04-05    132<L>  |  96  |  17  ----------------------------<  119<H>  3.5   |  28  |  1.05    Ca    8.4      05 Apr 2020 18:49    TPro  7.1  /  Alb  3.3  /  TBili  0.9  /  DBili  x   /  AST  37  /  ALT  39  /  AlkPhos  67  04-05              XR CHEST PORTABLE ROUTINE 1V                          COMPARISON:  None available.  FINDINGS:  Vague ground-glass opacity at the left lung base. Hemidiaphragms are sharp; no evidence of a pleural effusion. Cardiomediastinal silhouette is exaggerated by AP technique. Degenerative changes of the spine.  IMPRESSION:  Vague ground-glass opacity at the left lung base which may be infectious or inflammatory.  Personally reviewed by me.      -

## 2020-04-06 NOTE — CONSULT NOTE ADULT - SUBJECTIVE AND OBJECTIVE BOX
Date/Time Patient Seen:  		  Referring MD:   Data Reviewed	       Patient is a 64y old  Male who presents with a chief complaint of Hypoxia (06 Apr 2020 04:47)      Subjective/HPI       in bed  seen and examined  vs and meds reviewed  labs reviewed  H and P reviewed  ER provider note reviewed       History of Present Illness:  Reason for Admission: Hypoxia  History of Present Illness:   65 y/o male with PMH of HTN, HLD came to the ED complaining of difficulty breathing and fatigue. Patient said his symptoms has been going on for 21 days now, he was seen at [facility] MD was tested for CoVID-19, got a call on 3/26/2020 that he was positive. He has been self isolating since then. He noted still having fever, nausea, fatigue and decrease appetite. Shortness of breath is mainly with exertion. He has no orthopnea, leg edema, calf pain, cough, vomiting, abdominal pain, chest pain, palpitation.     PAST SURGICAL HISTORY:  No significant past surgical history.     No pertinent family history in first degree relatives.     No Pertinent Family History in first degree relatives of: -    Unable to obtain as stated above.     Social History:  Social History (marital status, living situation, occupation, tobacco use, alcohol and drug use, and sexual history): no cigarette, alcohol or illicit drug use. , lives with family     Tobacco Screening:  · Core Measure Site	No  · Has the patient used tobacco in the past 30 days?	No     Risk Assessment:    Present on Admission:  Deep Venous Thrombosis	no  Pulmonary Embolus	no  Urinary Catheter	no   Central Venous Catheter/PICC Line	no   Surgical Site Incision	no   Pressure Ulcer(s)	no      Heart Failure:  Does this patient have a history of or has been diagnosed with heart failure? no.    Physical Exam:    Reference Recent Physical Exam:  · In accordance with current standards limiting patient contact please refer to the recent:	ED Physical Exam    PAST MEDICAL & SURGICAL HISTORY:  HTN (hypertension)  High cholesterol  No significant past surgical history        Medication list         MEDICATIONS  (STANDING):  ascorbic acid 1000 milliGRAM(s) Oral daily  aspirin enteric coated 81 milliGRAM(s) Oral daily  atorvastatin 20 milliGRAM(s) Oral at bedtime  heparin  Injectable 5000 Unit(s) SubCutaneous every 8 hours  hydroxychloroquine 400 milliGRAM(s) Oral every 12 hours  hydroxychloroquine   Oral   metoprolol tartrate 25 milliGRAM(s) Oral two times a day    MEDICATIONS  (PRN):  acetaminophen   Tablet .. 650 milliGRAM(s) Oral every 6 hours PRN Temp greater or equal to 38C (100.4F), Mild Pain (1 - 3)  ondansetron Injectable 4 milliGRAM(s) IV Push every 6 hours PRN Nausea and/or Vomiting         Vitals log        ICU Vital Signs Last 24 Hrs  T(C): 37.4 (06 Apr 2020 05:31), Max: 38.2 (05 Apr 2020 18:10)  T(F): 99.3 (06 Apr 2020 05:31), Max: 100.7 (05 Apr 2020 18:10)  HR: 86 (06 Apr 2020 05:31) (81 - 90)  BP: 150/71 (06 Apr 2020 05:31) (102/56 - 150/76)  BP(mean): --  ABP: --  ABP(mean): --  RR: 22 (06 Apr 2020 05:31) (22 - 26)  SpO2: 95% (06 Apr 2020 05:31) (91% - 96%)           Input and Output:  I&O's Detail      Lab Data                        16.0   8.52  )-----------( 179      ( 05 Apr 2020 18:49 )             47.7     04-05    132<L>  |  96  |  17  ----------------------------<  119<H>  3.5   |  28  |  1.05    Ca    8.4      05 Apr 2020 18:49    TPro  7.1  /  Alb  3.3  /  TBili  0.9  /  DBili  x   /  AST  37  /  ALT  39  /  AlkPhos  67  04-05            Review of Systems	  sob      Objective     Physical Examination    heart s1s2  lung dec BS    Pertinent Lab findings & Imaging      [place]:  NO   Adequate UO     I&O's Detail           Discussed with:     Cultures:	        Radiology              EXAM:  XR CHEST PORTABLE ROUTINE 1V                                  PROCEDURE DATE:  04/05/2020          INTERPRETATION:  CLINICAL INFORMATION:  Shortness of breath and weakness.    TECHNIQUE:  AP view of the chest.    COMPARISON:  None available.    FINDINGS:  Vague groundglass opacity at the left lung base. Hemidiaphragms are sharp; no evidence of a pleural effusion. Cardiomediastinal silhouette is exaggerated by AP technique. Degenerative changes of the spine.    IMPRESSION:    Vague groundglass opacity at the left lung base which may be infectious or inflammatory.                  MEGHANN VILLAGOMEZ D.O. ATTENDING RADIOLOGIST  This document has been electronically signed. Apr 6 2020 12:30AM

## 2020-04-06 NOTE — ED ADULT NURSE REASSESSMENT NOTE - NS ED NURSE REASSESS COMMENT FT1
pt resting comfortably, no distress noted, spo2 94% on o2 2L N/C, pt admitted, aware of plan of care, safety/ fall precautions in place, will continue to monitor.

## 2020-04-06 NOTE — CONSULT NOTE ADULT - PROBLEM SELECTOR RECOMMENDATION 9
covid 19 pos from outpatient setting  tylenol prn  o2 support  keep sat > 90 pct  self prone  on plaquenil BID  cxr and labs reviewed  check biomarkers  I luis armando as tolerated  medical comorbidities management  supportive care  isol precs

## 2020-04-06 NOTE — H&P ADULT - PROBLEM SELECTOR PLAN 6
IMPROVE VTE Individual Risk Assessment          RISK                                                          Points    [  ] Previous VTE                                                3  [  ] Thrombophilia                                             2  [  ] Lower limb paralysis                                    2        (unable to hold up >15 seconds)    [  ] Current Cancer                                             2         (within 6 months)  [ x ] Immobilization > 24 hrs           (expected)     1  [  ] ICU/CCU stay > 24 hours                            1  [ x ] Age > 60                                                    1    IMPROVE VTE Score 2.    **IMPROVE score of 2 in addition to the other risk factors not included in this scoring system, was started on UFH 5000 units subcutaneous every 8 hours for DVT prophylaxis.

## 2020-04-06 NOTE — CONSULT NOTE ADULT - NSTELEHEALTH_GEN_ALL_CORE
No Patient is a 84y old  Male who presents with a chief complaint of unresponsiveness (24 Jan 2018 04:33)      INTERVAL HPI/ OVERNIGHT EVENTS: Pt was seen and examined at bedside today, No significant overnight events, pt denies any complaints, pt is on comfort care only.      MEDICATIONS  (STANDING):  aspirin  chewable 81 milliGRAM(s) Oral daily  atorvastatin 40 milliGRAM(s) Oral at bedtime  dextrose 5%. 1000 milliLiter(s) (50 mL/Hr) IV Continuous <Continuous>  dextrose 50% Injectable 12.5 Gram(s) IV Push once  dextrose 50% Injectable 25 Gram(s) IV Push once  dextrose 50% Injectable 25 Gram(s) IV Push once  docusate sodium Liquid 100 milliGRAM(s) Oral two times a day  doxazosin 4 milliGRAM(s) Oral at bedtime  finasteride 5 milliGRAM(s) Oral daily  heparin  Injectable 5000 Unit(s) SubCutaneous every 12 hours  insulin lispro (HumaLOG) corrective regimen sliding scale   SubCutaneous every 6 hours  metoclopramide Injectable 10 milliGRAM(s) IV Push every 8 hours  metoprolol     tartrate 25 milliGRAM(s) Oral two times a day  nystatin Powder 1 Application(s) Topical two times a day  pantoprazole  Injectable 40 milliGRAM(s) IV Push daily  petrolatum white Ointment 1 Application(s) Topical three times a day    MEDICATIONS  (PRN):  acetaminophen   Tablet 650 milliGRAM(s) Oral every 6 hours PRN For Temp greater than 38 C (100.4 F)  acetaminophen   Tablet. 650 milliGRAM(s) Oral every 6 hours PRN Moderate Pain (4 - 6)  ALBUTerol/ipratropium for Nebulization 3 milliLiter(s) Nebulizer every 6 hours PRN Shortness of Breath and/or Wheezing  aluminum hydroxide/magnesium hydroxide/simethicone Suspension 30 milliLiter(s) Oral every 6 hours PRN Dyspepsia  dextrose Gel 1 Dose(s) Oral once PRN Blood Glucose LESS THAN 70 milliGRAM(s)/deciliter  glucagon  Injectable 1 milliGRAM(s) IntraMuscular once PRN Glucose LESS THAN 70 milligrams/deciliter      Allergies    No Known Allergies    Intolerances        REVIEW OF SYSTEMS:    Unable to examine due to [ ] Altered Mental Status [ ] Advanced Dementia [ ] Expressive Aphasia [ ] Non-verbal patient    CONSTITUTIONAL: No fever, + generalized weakness/Fatigue, No weight loss  EYES: No eye pain, visual disturbances, or discharge  ENMT:  No difficulty hearing, tinnitus, vertigo; No sinus or throat pain  NECK: No pain or stiffness  RESPIRATORY: No shortness of breath, +secretions, + cough, No wheezing, sputum or hemoptysis   CARDIOVASCULAR: No chest pain, palpitations, or leg swelling  GASTROINTESTINAL: No abdominal pain. No nausea, vomiting, diarrhea or constipation. No melena or hematochezia.  GENITOURINARY: No dysuria, frequency, hematuria, or incontinence  NEUROLOGICAL: No headaches, Dizziness, memory loss, loss of strength, numbness, or tremors  SKIN: No itching, burning, rashes, or lesions   MUSCULOSKELETAL: No joint pain or swelling; No muscle, back, or extremity pain  PSYCHIATRIC: No depression, anxiety, mood swings, or difficulty sleeping  HEME/LYMPH: No easy bruising, or bleeding gum    Vital Signs Last 24 Hrs  T(C): 36.9 (02 Mar 2018 17:35), Max: 37.3 (02 Mar 2018 11:09)  T(F): 98.4 (02 Mar 2018 17:35), Max: 99.1 (02 Mar 2018 11:09)  HR: 90 (02 Mar 2018 17:35) (62 - 99)  BP: 105/55 (02 Mar 2018 17:35) (105/55 - 120/52)  BP(mean): --  RR: 18 (02 Mar 2018 17:35) (18 - 19)  SpO2: 100% (02 Mar 2018 17:35) (96% - 100%)    PHYSICAL EXAM:  GENERAL: Trach-vent, Cachetic   HEAD:  Atraumatic, Normocephalic  EYES:  conjunctiva and sclera clear  ENMT: dry mucous membranes  NECK: Trach, less secretions (foul smelling)  CHEST/LUNG: bilateral scattered rales, NO rhonchi, wheezing, or rubs  HEART: Regular rate and rhythm; No murmurs, rubs, or gallops  ABDOMEN: Peg tube in place, Soft, Nontender, Nondistended; Bowel sounds present  EXTREMITIES:  2+ Peripheral Pulses, No clubbing, cyanosis, or edema  NERVOUS SYSTEM:  Alert & Oriented, Good concentration  Skin: Right abdominal shingles rash      LABS:              CAPILLARY BLOOD GLUCOSE      POCT Blood Glucose.: 169 mg/dL (02 Mar 2018 14:19)  POCT Blood Glucose.: 156 mg/dL (02 Mar 2018 05:29)  POCT Blood Glucose.: 151 mg/dL (01 Mar 2018 23:10)  POCT Blood Glucose.: 144 mg/dL (01 Mar 2018 18:29)          RADIOLOGY & ADDITIONAL TESTS:          Imaging Personally Reviewed:  [ ] YES  [ ] NO    Consultant(s) Notes Reviewed:  [x ] YES  [ ] NO    Care Discussed with Consultants/Other Providers [x ] YES  [ ] NO

## 2020-04-06 NOTE — CONSULT NOTE ADULT - ASSESSMENT
The patient is a 64 year old male with a history of HTN, HL who is admitted with fevers, weakness, shortness of breath in the setting of COVID-19.    Plan:  - PVCs and ventricular trigeminy noted on telemetry  - Increase metoprolol tartrate to home dose of 50 mg bid  - Potassium ok, check magnesium in am  - Can check echocardiogram as outpatient when improved from COVID standpoint  - On methylprednisolone  - On hydroxychloroquine  - Pulm follow-up

## 2020-04-06 NOTE — H&P ADULT - PROBLEM SELECTOR PLAN 1
started on airborne & contact precautions, oxygen to keep SPO2 > 92% with continuous SPO2 monitoring, ascorbic acid, Tylenol PRN, Ferritin, CRP, and LDH, monitor Neutrophils/Lymph, trend temp & markers, was started on Hydroxychloroquine & Methylprednisolone as per current recommendations, ID consult with Dr. Bruce was called.

## 2020-04-06 NOTE — H&P ADULT - NSHPREVIEWOFSYSTEMS_GEN_ALL_CORE
Review of Systems:  · CONSTITUTIONAL: - - -  · Constitutional [+]: FEVER, decreased appetite, fatigue  · RESPIRATORY: - - -  · Respiratory [+]: SHORTNESS OF BREATH  · GASTROINTESTINAL: - - -  · Gastrointestinal [+]: NAUSEA  · Gastrointestinal [-]: no abdominal pain  · ROS STATEMENT: all other ROS negative except as per HPI -    Unable to obtain to shorten time of exposure in the isolation room as per current recommendations.

## 2020-04-06 NOTE — H&P ADULT - NSHPPHYSICALEXAM_GEN_ALL_CORE
PHYSICAL EXAM:   · CONSTITUTIONAL: Well appearing, awake, alert, oriented to person, place, time/situation and in no apparent distress.  · ENMT: Airway patent.  dry mucous membranes  · EYES: pink conjunctiva  · CARDIAC: Normal rate, regular rhythm.  Heart sounds S1, S2.  · RESPIRATORY: Breath sounds diminished.  · GASTROINTESTINAL: Abdomen soft, non-tender, no guarding. No CVAT.  · MUSCULOSKELETAL: Spine appears normal, range of motion is not limited, no muscle or joint tenderness  · NEUROLOGICAL: Alert and oriented, no focal deficits, no motor or sensory deficits.  · SKIN: Skin normal color for race, warm, dry and intact. No evidence of rash. -    Vital Signs Last 24 Hrs  T(C): 37.4 (06 Apr 2020 05:31), Max: 38.2 (05 Apr 2020 18:10)  T(F): 99.3 (06 Apr 2020 05:31), Max: 100.7 (05 Apr 2020 18:10)  HR: 86 (06 Apr 2020 05:31) (81 - 90)  BP: 150/71 (06 Apr 2020 05:31) (102/56 - 150/76)  BP(mean): --  RR: 22 (06 Apr 2020 05:31) (22 - 26)  SpO2: 95% (06 Apr 2020 05:31) (91% - 96%)    -

## 2020-04-07 ENCOUNTER — TRANSCRIPTION ENCOUNTER (OUTPATIENT)
Age: 65
End: 2020-04-07

## 2020-04-07 LAB
ANION GAP SERPL CALC-SCNC: 5 MMOL/L — SIGNIFICANT CHANGE UP (ref 5–17)
BASOPHILS # BLD AUTO: 0.01 K/UL — SIGNIFICANT CHANGE UP (ref 0–0.2)
BASOPHILS NFR BLD AUTO: 0.2 % — SIGNIFICANT CHANGE UP (ref 0–2)
BUN SERPL-MCNC: 18 MG/DL — SIGNIFICANT CHANGE UP (ref 7–23)
CALCIUM SERPL-MCNC: 8.8 MG/DL — SIGNIFICANT CHANGE UP (ref 8.4–10.5)
CHLORIDE SERPL-SCNC: 100 MMOL/L — SIGNIFICANT CHANGE UP (ref 96–108)
CO2 SERPL-SCNC: 28 MMOL/L — SIGNIFICANT CHANGE UP (ref 22–31)
CREAT SERPL-MCNC: 0.9 MG/DL — SIGNIFICANT CHANGE UP (ref 0.5–1.3)
EOSINOPHIL # BLD AUTO: 0 K/UL — SIGNIFICANT CHANGE UP (ref 0–0.5)
EOSINOPHIL NFR BLD AUTO: 0 % — SIGNIFICANT CHANGE UP (ref 0–6)
GLUCOSE SERPL-MCNC: 158 MG/DL — HIGH (ref 70–99)
HCT VFR BLD CALC: 44.9 % — SIGNIFICANT CHANGE UP (ref 39–50)
HCV AB S/CO SERPL IA: 0.28 S/CO — SIGNIFICANT CHANGE UP (ref 0–0.99)
HCV AB SERPL-IMP: SIGNIFICANT CHANGE UP
HGB BLD-MCNC: 15.1 G/DL — SIGNIFICANT CHANGE UP (ref 13–17)
IMM GRANULOCYTES NFR BLD AUTO: 0.4 % — SIGNIFICANT CHANGE UP (ref 0–1.5)
LYMPHOCYTES # BLD AUTO: 0.73 K/UL — LOW (ref 1–3.3)
LYMPHOCYTES # BLD AUTO: 14.7 % — SIGNIFICANT CHANGE UP (ref 13–44)
MAGNESIUM SERPL-MCNC: 2.3 MG/DL — SIGNIFICANT CHANGE UP (ref 1.6–2.6)
MCHC RBC-ENTMCNC: 30.2 PG — SIGNIFICANT CHANGE UP (ref 27–34)
MCHC RBC-ENTMCNC: 33.6 GM/DL — SIGNIFICANT CHANGE UP (ref 32–36)
MCV RBC AUTO: 89.8 FL — SIGNIFICANT CHANGE UP (ref 80–100)
MONOCYTES # BLD AUTO: 0.28 K/UL — SIGNIFICANT CHANGE UP (ref 0–0.9)
MONOCYTES NFR BLD AUTO: 5.6 % — SIGNIFICANT CHANGE UP (ref 2–14)
NEUTROPHILS # BLD AUTO: 3.93 K/UL — SIGNIFICANT CHANGE UP (ref 1.8–7.4)
NEUTROPHILS NFR BLD AUTO: 79.1 % — HIGH (ref 43–77)
NRBC # BLD: 0 /100 WBCS — SIGNIFICANT CHANGE UP (ref 0–0)
PLATELET # BLD AUTO: 203 K/UL — SIGNIFICANT CHANGE UP (ref 150–400)
POTASSIUM SERPL-MCNC: 4 MMOL/L — SIGNIFICANT CHANGE UP (ref 3.5–5.3)
POTASSIUM SERPL-SCNC: 4 MMOL/L — SIGNIFICANT CHANGE UP (ref 3.5–5.3)
RBC # BLD: 5 M/UL — SIGNIFICANT CHANGE UP (ref 4.2–5.8)
RBC # FLD: 12.4 % — SIGNIFICANT CHANGE UP (ref 10.3–14.5)
SODIUM SERPL-SCNC: 133 MMOL/L — LOW (ref 135–145)
WBC # BLD: 4.97 K/UL — SIGNIFICANT CHANGE UP (ref 3.8–10.5)
WBC # FLD AUTO: 4.97 K/UL — SIGNIFICANT CHANGE UP (ref 3.8–10.5)

## 2020-04-07 PROCEDURE — 93010 ELECTROCARDIOGRAM REPORT: CPT

## 2020-04-07 PROCEDURE — 99233 SBSQ HOSP IP/OBS HIGH 50: CPT

## 2020-04-07 RX ORDER — HYDROXYCHLOROQUINE SULFATE 200 MG
1 TABLET ORAL
Qty: 8 | Refills: 0
Start: 2020-04-07 | End: 2020-04-10

## 2020-04-07 RX ADMIN — ENOXAPARIN SODIUM 40 MILLIGRAM(S): 100 INJECTION SUBCUTANEOUS at 20:41

## 2020-04-07 RX ADMIN — ENOXAPARIN SODIUM 40 MILLIGRAM(S): 100 INJECTION SUBCUTANEOUS at 08:54

## 2020-04-07 RX ADMIN — Medication 1000 MILLIGRAM(S): at 08:53

## 2020-04-07 RX ADMIN — Medication 81 MILLIGRAM(S): at 08:53

## 2020-04-07 RX ADMIN — Medication 40 MILLIGRAM(S): at 16:01

## 2020-04-07 RX ADMIN — Medication 50 MILLIGRAM(S): at 17:13

## 2020-04-07 RX ADMIN — ATORVASTATIN CALCIUM 20 MILLIGRAM(S): 80 TABLET, FILM COATED ORAL at 20:41

## 2020-04-07 RX ADMIN — Medication 40 MILLIGRAM(S): at 08:54

## 2020-04-07 RX ADMIN — Medication 200 MILLIGRAM(S): at 20:42

## 2020-04-07 RX ADMIN — Medication 40 MILLIGRAM(S): at 20:42

## 2020-04-07 RX ADMIN — Medication 50 MILLIGRAM(S): at 04:33

## 2020-04-07 RX ADMIN — Medication 1 DROP(S): at 21:04

## 2020-04-07 RX ADMIN — Medication 200 MILLIGRAM(S): at 08:54

## 2020-04-07 NOTE — DISCHARGE NOTE PROVIDER - INSTRUCTIONS
For Constipation :   • Increase your water intake. Drink at least 8 glasses of water daily.  • Try adding fiber to your diet by eating fruits, vegetables and foods that are rich in grains.  • If you do experience constipation, you may take an over-the-counter stool softener/laxative such as Rosibel Colace, Senekot or  Milk of Magnesia.

## 2020-04-07 NOTE — DISCHARGE NOTE NURSING/CASE MANAGEMENT/SOCIAL WORK - PATIENT PORTAL LINK FT
You can access the FollowMyHealth Patient Portal offered by [facility] by registering at the following website: [email or web address]. By joining [facility]’s FollowMyHealth portal, you will also be able to view your health information using other applications (apps) compatible with our system.

## 2020-04-07 NOTE — DISCHARGE NOTE PROVIDER - NSDCFUADDINST_GEN_ALL_CORE_FT
You were diagnosed with Covid 19.    You should restrict activities outside your home except for getting medical care.  Do not go to work, school or public areas.  Avoid using pubic transportation, ride share or taxis.  Seperate yourself from other people and pets.  If possible use seperate rooms, bathrooms and utensils.  Call ahead when going to visit your doctor.  Wear a facemask when interacting with other people.  Cover your coughs and sneezes with your sleeve and elbow.  Wash your hands often.  Clean surfaces with disinfectant .  Monitor your symptoms.  Call your doctor if you have fever over 100, shortness of breath or new or unusual symptoms.  Use oxygen as prescribed.

## 2020-04-07 NOTE — DISCHARGE NOTE PROVIDER - CARE PROVIDER_API CALL
Haroldo German)  Family Medicine; Geriatric Medicine  [address] [place] [address]  Phone: [phone]  Fax: [phone]  Follow Up Time:

## 2020-04-07 NOTE — PROGRESS NOTE ADULT - PROBLEM SELECTOR PLAN 3
Mild, stable on todays lab  holding repeat IV bolus for now, pt appears euvolemic  repeat sodium level in am.

## 2020-04-07 NOTE — DISCHARGE NOTE PROVIDER - NSDCACTIVITY_GEN_ALL_CORE
Walking - Indoors allowed/Walking - Outdoors allowed/Stairs allowed No heavy lifting/straining/Walking - Outdoors allowed/Walking - Indoors allowed/Stairs allowed

## 2020-04-07 NOTE — DISCHARGE NOTE NURSING/CASE MANAGEMENT/SOCIAL WORK - NSSCNAMETXT_GEN_ALL_CORE
[facility] (formerly [facility])   [address] [place] [address]

## 2020-04-07 NOTE — DISCHARGE NOTE PROVIDER - NSDCMRMEDTOKEN_GEN_ALL_CORE_FT
Aspir 81 oral delayed release tablet: 1 tab(s) orally once a day  lovastatin 40 mg oral tablet: 2 tab(s) orally once a day  metoprolol tartrate 50 mg oral tablet: 1 tab(s) orally 2 times a day Aspir 81 oral delayed release tablet: 1 tab(s) orally once a day  hydroxychloroquine 200 mg oral tablet: 1 tab(s) orally every 12 hours   lovastatin 40 mg oral tablet: 2 tab(s) orally once a day  metoprolol tartrate 50 mg oral tablet: 1 tab(s) orally 2 times a day

## 2020-04-07 NOTE — DISCHARGE NOTE PROVIDER - HOSPITAL COURSE
Patient is a 64 year old male who presented with shortness of breath on 4/5. He was found to be COVID-19 positive on 3/26/2020. He was sent home and as he felt his condition worsened he went to [facility] ER. Patient was admitted and started on oxygen via nasal cannula. She was started on plaquenil. As of 4/7 condition is improved and the patient will be sent home on suppplemental oxygen. Patient is a 64 year old male who presented with shortness of breath on 4/5. He was found to be COVID-19 positive on 3/26/2020. He was sent home and as he felt his condition worsened he went to [facility] ER. Patient was admitted and started on oxygen via nasal cannula. She was started on plaquenil. As of 4/7 condition is improved and the patient will be sent home on suppplemental oxygen. Patient is a 64 year old male who presented with shortness of breath on 4/5. He was found to be COVID-19 positive on 3/26/2020. He was sent home and as he felt his condition worsened he went to [facility] ER. Patient was admitted and started on oxygen via nasal cannula. She was started on plaquenil. As of 4/8/2020 condition is improved and the patient will be sent home on suppplemental oxygen. The patient is a 65 y/o male with PMH of HTN, HLD came to the ED complaining of difficulty breathing and fatigue 4/6/20 Patient said his symptoms has been going on for 21 days now, he was seen at [facility] MD was tested for CoVID-19, got a call on 3/26/2020 that he was positive. He had been self isolating since then. He noted still having fever, nausea, fatigue and decrease appetite. Shortness of breath is mainly with exertion. He has no orthopnea, leg edema, calf pain, cough, vomiting, abdominal pain, chest pain, palpitation. He was admitted for hypoxia, hyonatremia, and a COVID-19 positive status. The patient received supportive care ,02, IV fluids, DVT prophylaxis , Plaquinil, and Steriods. The patient during hospital course had  PVC's and ventricular trigeminy placed on telemertry (pt to f/u outpatient ). Patient to be D/C home . The patient is a 65 y/o male with PMH of HTN, HLD came to the ED complaining of difficulty breathing and fatigue 4/6/20 Patient said his symptoms has been going on for 21 days now, he was seen at [facility] MD was tested for CoVID-19, got a call on 3/26/2020 that he was positive. He had been self isolating since then. He noted still having fever, nausea, fatigue and decrease appetite. Shortness of breath is mainly with exertion. He has no orthopnea, leg edema, calf pain, cough, vomiting, abdominal pain, chest pain, palpitation. He was admitted for hypoxia, hyonatremia, and a COVID-19 positive status. The patient received supportive care ,02, IV fluids, DVT prophylaxis , Plaquinil, and Steriods. The patient during hospital course had  PVC's and ventricular trigeminy placed on telemertry (pt to f/u outpatient ). Patient to be D/C home .        On day of discharge patient was seen and examined and stable for discharge.         Vital Signs Last 24 Hrs    T(C): 36.4 (08 Apr 2020 08:12), Max: 36.4 (07 Apr 2020 23:59)    T(F): 97.5 (08 Apr 2020 08:12), Max: 97.5 (07 Apr 2020 23:59)    HR: 62 (08 Apr 2020 08:12) (62 - 75)    BP: 150/88 (08 Apr 2020 08:12) (140/78 - 155/84)    BP(mean): --    RR: 18 (08 Apr 2020 08:12) (18 - 18)    SpO2: 93% (08 Apr 2020 08:12) (93% - 96%)        PHYSICAL EXAM:    GENERAL: NAD, well-groomed, well-developed    HEAD:  Atraumatic, Normocephalic    EYES: EOMI, PERRLA, conjunctiva and sclera clear    ENMT: No tonsillar erythema, exudates, or enlargement; Moist mucous membranes, Good dentition, No lesions    NECK: Supple, No JVD, Normal thyroid    NERVOUS SYSTEM:  Alert & Oriented X3, Good concentration; Motor Strength 5/5 B/L upper and lower extremities; DTRs 2+ intact and symmetric    CHEST/LUNG: No rales, rhonchi, wheezing, or rubs    HEART: Regular rate and rhythm; No murmurs, rubs, or gallops    ABDOMEN: Soft, Nontender, Nondistended; Bowel sounds present    EXTREMITIES:  2+ Peripheral Pulses, No clubbing or cyanosis    LYMPH: No lymphadenopathy noted    SKIN: No rashes or lesions

## 2020-04-07 NOTE — PROGRESS NOTE ADULT - SUBJECTIVE AND OBJECTIVE BOX
Chief Complaint: Shortness of breath    Interval Events: No events overnight.    Review of Systems:  General: No fevers, chills, weight loss or gain  Skin: No rashes, color changes  Cardiovascular: No chest pain, orthopnea  Respiratory: No shortness of breath, cough  Gastrointestinal: No nausea, abdominal pain  Genitourinary: No incontinence, pain with urination  Musculoskeletal: No pain, swelling, decreased range of motion  Neurological: No headache, weakness  Psychiatric: No depression, anxiety  Endocrine: No weight loss or gain, increased thirst  All other systems are comprehensively negative.    Physical Exam:  Vitals:        Vital Signs Last 24 Hrs  T(C): 36.4 (07 Apr 2020 07:58), Max: 36.9 (06 Apr 2020 13:48)  T(F): 97.5 (07 Apr 2020 07:58), Max: 98.4 (06 Apr 2020 13:48)  HR: 69 (07 Apr 2020 07:58) (69 - 79)  BP: 156/94 (07 Apr 2020 07:58) (115/75 - 156/94)  BP(mean): --  RR: 17 (07 Apr 2020 07:58) (17 - 18)  SpO2: 95% (07 Apr 2020 07:58) (94% - 96%)  General: NAD  HEENT: MMM  Neck: No JVD, no carotid bruit  Lungs: CTAB  CV: RRR, nl S1/S2, no M/R/G  Abdomen: S/NT/ND, +BS  Extremities: No LE edema, no cyanosis  Neuro: AAOx3, non-focal  Skin: No rash    Labs:                        15.1   4.97  )-----------( 203      ( 07 Apr 2020 07:18 )             44.9     04-07    133<L>  |  100  |  18  ----------------------------<  158<H>  4.0   |  28  |  0.90    Ca    8.8      07 Apr 2020 07:18  Mg     2.3     04-07    TPro  7.1  /  Alb  3.3  /  TBili  0.9  /  DBili  x   /  AST  37  /  ALT  39  /  AlkPhos  67  04-05            Telemetry: Sinus rhythm, PVCs

## 2020-04-07 NOTE — PROGRESS NOTE ADULT - PROBLEM SELECTOR PLAN 2
2/2 COVID-19  stable on O2 via nasal cannula, ordered home O2, but care coordinator reports supplier is unable to fulfill order today  will defer discharge until patient is able to get home O2  will plan to have pt followup with PCP as outpatient for further management and wean of O2  pulmonary following

## 2020-04-07 NOTE — PROGRESS NOTE ADULT - SUBJECTIVE AND OBJECTIVE BOX
PETER BACA is a 64yMale , patient examined and chart reviewed.     INTERVAL HPI/ OVERNIGHT EVENTS:   Stable. On NC. Afebrile.    PAST MEDICAL & SURGICAL HISTORY:  HTN (hypertension)  High cholesterol  No significant past surgical history    For details regarding the patient's social history, family history, and other miscellaneous elements, please refer the initial infectious diseases consultation and/or the admitting history and physical examination for this admission.    ROS:  Unable to obtain due to :     ROS:  CONSTITUTIONAL:  Negative fever or chills, feels well, good appetite  EYES:  Negative  blurry vision or double vision  CARDIOVASCULAR:  Negative for chest pain or palpitations  RESPIRATORY:  Negative for cough, wheezing, or SOB   GASTROINTESTINAL:  Negative for nausea, vomiting, diarrhea, constipation, or abdominal pain  GENITOURINARY:  Negative frequency, urgency or dysuria  NEUROLOGIC:  No headache, confusion, dizziness, lightheadedness  All other systems were reviewed and are negative         Current inpatient medications :    ANTIBIOTICS/RELEVANT:  hydroxychloroquine   Oral   hydroxychloroquine 200 milliGRAM(s) Oral every 12 hours      acetaminophen   Tablet .. 650 milliGRAM(s) Oral every 6 hours PRN  ascorbic acid 1000 milliGRAM(s) Oral daily  aspirin enteric coated 81 milliGRAM(s) Oral daily  atorvastatin 20 milliGRAM(s) Oral at bedtime  enoxaparin Injectable 40 milliGRAM(s) SubCutaneous every 12 hours  methylPREDNISolone sodium succinate Injectable 40 milliGRAM(s) IV Push every 8 hours  metoprolol tartrate 50 milliGRAM(s) Oral two times a day  ondansetron Injectable 4 milliGRAM(s) IV Push every 6 hours PRN      Objective:    T(C): 36.3 (04-07-20 @ 15:30), Max: 36.6 (04-06-20 @ 23:53)  HR: 66 (04-07-20 @ 15:30) (66 - 77)  BP: 135/83 (04-07-20 @ 15:30) (135/83 - 156/94)  RR: 19 (04-07-20 @ 15:30) (17 - 19)  SpO2: 94% (04-07-20 @ 15:30) (94% - 95%)      Physical Exam:  General:  no acute distress  Eyes: sclera anicteric, pupils equal and reactive to light  ENMT: buccal mucosa moist, pharynx not injected  Neck: supple, trachea midline  Lungs: clear, no wheeze/rhonchi  Cardiovascular: regular rate and rhythm, S1 S2  Abdomen: soft, nontender, no organomegaly present, bowel sounds normal  Neurological: alert and oriented x3, Cranial Nerves II-XII grossly intact  Skin: no increased ecchymosis/petechiae/purpura  Lymph Nodes: no palpable cervical/supraclavicular lymph nodes enlargements  Extremities: no cyanosis/clubbing/edema      LABS:                          15.1   4.97  )-----------( 203      ( 07 Apr 2020 07:18 )             44.9       04-07    133<L>  |  100  |  18  ----------------------------<  158<H>  4.0   |  28  |  0.90    Ca    8.8      07 Apr 2020 07:18  Mg     2.3     04-07    TPro  7.1  /  Alb  3.3  /  TBili  0.9  /  DBili  x   /  AST  37  /  ALT  39  /  AlkPhos  67  04-05      MICROBIOLOGY:                  RADIOLOGY & ADDITIONAL STUDIES:    EXAM:  XR CHEST PORTABLE ROUTINE 1V                                  PROCEDURE DATE:  04/05/2020          INTERPRETATION:  CLINICAL INFORMATION:  Shortness of breath and weakness.    TECHNIQUE:  AP view of the chest.    COMPARISON:  None available.    FINDINGS:  Vague groundglass opacity at the left lung base. Hemidiaphragms are sharp; no evidence of a pleural effusion. Cardiomediastinal silhouette is exaggerated by AP technique. Degenerative changes of the spine.    IMPRESSION:    Vague groundglass opacity at the left lung base which may be infectious or inflammatory.    Assessment :   65 y/o male with PMH of HTN, HLD presented with CC of SOB on exertion Poor appetite and fatigue x 3 weeks Was seen at [facility] MD was tested for CoVID-19. He got a call on 3/26/2020 that he was positive.  Did not feel better. Admitted for COVID 19 pneumonia. Hypoxic on NC 02.      Plan :   On hydroxychloroquine  On steroids short course X 5 days at most.  Trend temps   COVID-19---high risk period for decompensation  (7-14 days post symptom onset), avoid aerosolizing procedures, NSAIDs   -avoid excessive blood draws and frequent CXRs  -daily CBC w diff to follow eos, lymphocytes and neutrophils and daily NLR calculation (NLR <3 low vs >5 high)  -Other labs that may be selectively used to risk stratify and predict clinical course, ie: Ferritin (lower risk <450 vs >850) CRP (low risk <2 and higher risk >6) and LDH, D Dimer  -antibiotics only if there is concern for a bacterial process  -neutrophil/lymphocyte ratio 8.6  -pulm toileting  -Increase activity  -Wean off 02 or dc home on home 02.    D/w Hospitalist      Continue with present regiment.  Appropriate use of antibiotics and adverse effects reviewed.      I have discussed the above plan of care with patient/ family in detail. They expressed understanding of the  treatment plan . Risks, benefits and alternatives discussed in detail. I have asked if they have any questions or concerns and appropriately addressed them to the best of my ability .    > 35 minutes were spent in direct patient care reviewing notes, medications ,labs data/ imaging , discussion with multidisciplinary team.    Thank you for allowing me to participate in care of your patient .    Natalia Bruce MD  Infectious Disease  [phone]

## 2020-04-07 NOTE — PROGRESS NOTE ADULT - SUBJECTIVE AND OBJECTIVE BOX
Patient ID: This is a 72 year old male     Chief Complaint   Patient presents with   • Diabetes     HPI: The patient is 72 year old male with HHD, CAD, PVD, BPH, Dyslipidemia, and DM presenting for a follow-up visit. The patient also checks his blood sugar everyday.     The patient reports he has not been taking rosuvastatin and pravastatin because they were causing him leg pain. He walks 2 miles every morning.    The patient reports dry flaky skin to the bottom of his feet. He has tried applying Vaseline in the past but with little relief. He admits to sweating frequently.     Review of Systems   Constitutional: Negative for fatigue.   HENT: Negative for congestion, ear pain, postnasal drip, rhinorrhea and sore throat.    Eyes: Negative for visual disturbance.   Respiratory: Negative for shortness of breath.    Cardiovascular: Negative for chest pain and leg swelling.   Gastrointestinal: Negative for abdominal pain, diarrhea, nausea and vomiting.   Genitourinary: Negative for difficulty urinating and dysuria.   Musculoskeletal: Negative for gait problem.   Neurological: Negative for dizziness, numbness and headaches.           Summary of your Discharge Medications           Accurate as of 6/19/19 11:59 PM. Always use your most recent med list.               Take these Medications      Details   ACCU-CHEK LEVI PLUS test strip   Generic drug:  blood glucose  TEST BLOOD SUGAR TWICE DAILY     aspirin 81 MG tablet   Take 1 tablet by mouth daily.     clotrimazole 1 % cream  Commonly known as:  LOTRIMIN   Apply to affected area twice daily     fenofibrate 160 MG tablet   TAKE 1 TABLET BY MOUTH DAILY     glipiZIDE 5 MG 24 hr tablet  Commonly known as:  GLUCOTROL   Take 1 tablet by mouth daily.     lisinopril 10 MG tablet  Commonly known as:  ZESTRIL   TAKE 1 TABLET BY MOUTH DAILY     metformin 1000 MG tablet  Commonly known as:  GLUCOPHAGE   TAKE 1 TABLET BY MOUTH EVERY 12 HOURS     MYRBETRIQ 50 MG 24 hr tablet   Generic  Patient is a 64y old  Male who presents with a chief complaint of Hypoxia (07 Apr 2020 17:27)      INTERVAL HPI/OVERNIGHT EVENTS:  Patient seen awake in bed. Nurse reports patient experienced a desaturation episode earlier when trying to wean to room air. Patient denies cough or SOB at this time, denies fevers, reports feeling lightheaded when getting out of bed.     MEDICATIONS  (STANDING):  ascorbic acid 1000 milliGRAM(s) Oral daily  aspirin enteric coated 81 milliGRAM(s) Oral daily  atorvastatin 20 milliGRAM(s) Oral at bedtime  enoxaparin Injectable 40 milliGRAM(s) SubCutaneous every 12 hours  hydroxychloroquine   Oral   hydroxychloroquine 200 milliGRAM(s) Oral every 12 hours  methylPREDNISolone sodium succinate Injectable 40 milliGRAM(s) IV Push every 8 hours  metoprolol tartrate 50 milliGRAM(s) Oral two times a day    MEDICATIONS  (PRN):  acetaminophen   Tablet .. 650 milliGRAM(s) Oral every 6 hours PRN Temp greater or equal to 38C (100.4F), Mild Pain (1 - 3)  ondansetron Injectable 4 milliGRAM(s) IV Push every 6 hours PRN Nausea and/or Vomiting      Allergies    No Known Allergies    Intolerances        REVIEW OF SYSTEMS:  CONSTITUTIONAL: No fever, weight loss, or fatigue  EYES: No eye pain, visual disturbances, or discharge  ENMT:  Difficulty hearing on L side, tinnitus, vertigo; No sinus or throat pain  NECK: No pain or stiffness  BREASTS: No pain, masses, or nipple discharge  RESPIRATORY: No cough, wheezing, chills or hemoptysis; No shortness of breath  CARDIOVASCULAR: No chest pain, palpitations, lightheadedness, or leg swelling  GASTROINTESTINAL: No abdominal or epigastric pain. No nausea, vomiting, or hematemesis; No diarrhea or constipation. No melena or hematochezia.  GENITOURINARY: No dysuria, frequency, hematuria, or incontinence  NEUROLOGICAL: Lightheadedness as noted above. Weakness of L side. No headaches, memory loss, vertigo, loss of strength, numbness, or tremors  SKIN: No itching, burning, rashes, or lesions   LYMPH NODES: No enlarged glands  ENDOCRINE: No heat or cold intolerance; No hair loss; No polydipsia or polyuria  MUSCULOSKELETAL: No joint pain or swelling; No muscle, back, or extremity pain  PSYCHIATRIC: No depression, anxiety, or mood swings  HEME/LYMPH: No easy bruising, or bleeding gums  ALLERGY AND IMMUNOLOGIC: No hives or eczema    Vital Signs Last 24 Hrs  T(C): 36.3 (07 Apr 2020 15:30), Max: 36.6 (06 Apr 2020 23:53)  T(F): 97.4 (07 Apr 2020 15:30), Max: 97.9 (06 Apr 2020 23:53)  HR: 66 (07 Apr 2020 15:30) (66 - 77)  BP: 135/83 (07 Apr 2020 15:30) (135/83 - 156/94)  BP(mean): --  RR: 19 (07 Apr 2020 15:30) (17 - 19)  SpO2: 94% (07 Apr 2020 15:30) (94% - 95%)    PHYSICAL EXAM:  GENERAL: NAD, well-groomed, well-developed  HEAD:  Atraumatic, Normocephalic  EYES: EOMI, PERRLA, conjunctiva and sclera clear  ENMT: Moist mucous membranes, Good dentition, No lesions; No tonsillar erythema, exudates, or enlargement  NECK: Supple, No JVD, Normal thyroid  NERVOUS SYSTEM:  Alert & Oriented X3, Good concentration; All 4 extremities mobile, no gross sensory deficits.   CHEST/LUNG: Clear to auscultation bilaterally; No rales, rhonchi, wheezing, or rubs  HEART: Regular rate and rhythm; No murmurs, rubs, or gallops  ABDOMEN: Soft, Nontender, Nondistended; Bowel sounds present  EXTREMITIES:  2+ Peripheral Pulses, No clubbing, cyanosis, or edema  LYMPH: No lymphadenopathy noted  SKIN: No rashes or lesions    LABS:                        15.1   4.97  )-----------( 203      ( 07 Apr 2020 07:18 )             44.9     07 Apr 2020 07:18    133    |  100    |  18     ----------------------------<  158    4.0     |  28     |  0.90     Ca    8.8        07 Apr 2020 07:18  Mg     2.3       07 Apr 2020 07:18          CAPILLARY BLOOD GLUCOSE          RADIOLOGY & ADDITIONAL TESTS:    Imaging Personally Reviewed:  [ ] YES     Consultant(s) Notes Reviewed:      Care Discussed with Consultants/Other Providers:    Advanced Directives: [ ] DNR  [ ] No feeding tube  [ ] MOLST in chart  [ ] MOLST completed today  [ ] Unknown drug:  mirabegron ER  Take 1 tablet by mouth daily.     omeprazole 20 MG capsule  Commonly known as:  PrilOSEC      SOFTCLIX LANCETS Misc   daily.            LABS  Lab Results   Component Value Date    SODIUM 132 (L) 06/18/2019    POTASSIUM 5.5 (H) 06/18/2019    CHLORIDE 101 06/18/2019    CO2 28 06/18/2019    BUN 22 (H) 06/18/2019    CREATININE 0.90 06/18/2019    CALCIUM 9.7 06/18/2019    ALBUMIN 4.5 06/18/2019    BILIRUBIN 0.5 06/18/2019    ALKPT 59 06/18/2019    GPT 21 06/18/2019    AST 14 06/18/2019    GLUCOSE 250 (H) 06/18/2019     Lab Results   Component Value Date    CHOLESTEROL 168 06/18/2019    TRIGLYCERIDE 117 06/18/2019    HDL 39 (L) 06/18/2019    CALCLDL 106 06/18/2019     Lab Results   Component Value Date    TLYMPH 8 04/13/2018    PMON 8 04/13/2018    PEOS 0 04/13/2018    PBASO 0 04/13/2018    ANEUT 11.5 (H) 04/13/2018    ALYMS 1 04/13/2018    DELLA 1.1 (H) 04/13/2018    AEOS 0 (L) 04/13/2018    ABASO 0 04/13/2018    NEUT NOT APPLICABLE 04/13/2018    TDIF AUTOMATED DIFFERENTIAL 04/13/2018    IANC NOT APPLICABLE 09/24/2018    NRBCRE 0 09/24/2018    PIMGR 0 04/13/2018    AIMGR 0.1 04/13/2018     Lab Results   Component Value Date    HGBA1C 7.8 (H) 06/18/2019     Lab Results   Component Value Date    TSH 0.375 04/13/2018     Lab Results   Component Value Date    PSA 2.98 07/12/2018     Lab Results   Component Value Date    URMIC 10.8 03/30/2017    MALBCR 74.5 (H) 03/30/2017       ALLERGIES:  No Known Allergies    Patient Active Problem List   Diagnosis   • Acid reflux   • BPH (benign prostatic hyperplasia)   • Benign colonic polyp   • Carotid artery stenosis   • Hypertensive heart disease   • Coronary artery disease   • Dyslipidemia   • Nocturia   • Urinary incontinence   • PVD (peripheral vascular disease) (CMS/[facility])   • Iliac artery aneurysm, right (CMS/formerly [facility])   • Controlled type 2 diabetes mellitus with hyperglycemia, without long-term current use of insulin (CMS/formerly [facility])   • Tinea pedis of both feet         Past Surgical History:   Procedure Laterality Date   • Anes reoperat carotid thromboendarterect     • Coronary angioplasty with stent placement     • Coronary artery bypass graft     • Hernia repair         History reviewed. No pertinent family history.    Social History     Socioeconomic History   • Marital status:      Spouse name: Not on file   • Number of children: Not on file   • Years of education: Not on file   • Highest education level: Not on file   Occupational History   • Not on file   Social Needs   • Financial resource strain: Not on file   • Food insecurity:     Worry: Not on file     Inability: Not on file   • Transportation needs:     Medical: Not on file     Non-medical: Not on file   Tobacco Use   • Smoking status: Never Smoker   • Smokeless tobacco: Never Used   Substance and Sexual Activity   • Alcohol use: No     Frequency: Never   • Drug use: No   • Sexual activity: Not on file   Lifestyle   • Physical activity:     Days per week: Not on file     Minutes per session: Not on file   • Stress: Not on file   Relationships   • Social connections:     Talks on phone: Not on file     Gets together: Not on file     Attends [employer] service: Not on file     Active member of club or organization: Not on file     Attends meetings of clubs or organizations: Not on file     Relationship status: Not on file   • Intimate partner violence:     Fear of current or ex partner: Not on file     Emotionally abused: Not on file     Physically abused: Not on file     Forced sexual activity: Not on file   Other Topics Concern   • Not on file   Social History Narrative   • Not on file         There is no immunization history on file for this patient.    Physical:  Visit Vitals  /78   Pulse 94   Ht 5' 8\" (1.727 m)   Wt 84.9 kg (187 lb 2.7 oz)   SpO2 97%   BMI 28.46 kg/m²     Physical Exam   Constitutional: He is oriented to person, place, and time. He appears well-developed and well-nourished. No  distress.   HENT:   Head: Normocephalic and atraumatic.   Hearing non-impaired   Neck: No thyromegaly present.   No carotid bruits   Cardiovascular: Normal rate, regular rhythm and normal heart sounds.   No murmur heard.  Pulses:       Dorsalis pedis pulses are 2+ on the right side, and 2+ on the left side.        Posterior tibial pulses are 2+ on the right side, and 2+ on the left side.   Pulmonary/Chest: Effort normal and breath sounds normal. No respiratory distress. He has no wheezes.   Abdominal: Soft. Bowel sounds are normal. He exhibits no distension and no mass. There is no tenderness.   Musculoskeletal: He exhibits no edema or tenderness.   Neurological: He is alert and oriented to person, place, and time. No cranial nerve deficit.   B/L normal monofilament test   Skin: Skin is dry. No rash noted.   No sores, no ulcers, no lesions, no erythema on B/L feet; normal toenails; dry flaky skin to bottom of B/L feet   Psychiatric: He has a normal mood and affect. His behavior is normal.       Problem List Items Addressed This Visit        Circulatory    Carotid artery stenosis    Relevant Medications    aspirin 81 MG tablet    Hypertensive heart disease - Primary    Relevant Medications    aspirin 81 MG tablet    Other Relevant Orders    BASIC METABOLIC PANEL    Coronary artery disease    Relevant Medications    aspirin 81 MG tablet    PVD (peripheral vascular disease) (CMS/[facility])    Relevant Medications    aspirin 81 MG tablet       Urinary    Nocturia    Relevant Medications    mirabegron ER (MYRBETRIQ) 50 MG 24 hr tablet    Urinary incontinence    Relevant Medications    mirabegron ER (MYRBETRIQ) 50 MG 24 hr tablet       Integumentary    Tinea pedis of both feet    Relevant Medications    clotrimazole (LOTRIMIN) 1 % cream       Endocrine    Dyslipidemia    Controlled type 2 diabetes mellitus with hyperglycemia, without long-term current use of insulin (CMS/[facility])      Other Visit Diagnoses     Hyperkalemia               Reviewed, renewed, and updated med list.  Reviewed recent labs.  DM: A1c elevated, restart Glipizide and will reassess with updated labs in 2 weeks; will get info from [facility] about diabetic eye exam   HTN: cpm, counseled on importance of DASH diet  Dyslipidemia: Previous HDL low, continue walking regularly and counseled on LDL goals and watching a low fat/cholesterol diet.  PVD: Patient advised to start 81mg aspirin QD  CAD/carotid artery stenosis: Stable, cpm  Tinea pedis of both feet: Apply Lotrimin cream BID  Urinary incontinence/Nocturia: Stable, cpm  Hyperkalemia:  Repeat potassium  F/U in 4 months  Pt declined pneumonia and shingles vaccine in office today  Pt has verbalized understanding and is in agreement  Follow up sooner with any new concerns or complaints      Scribe Attestation: Entered by Kiana Engel, acting as scribe for Dr. Yoselin Jamil MD    Provider Attestation: The documentation recorded by the scribe accurately reflects the service I personally performed and the decisions made by me, Yoselin Jamil MD      Patient is a 64y old  Male who presents with a chief complaint of Hypoxia (07 Apr 2020 17:27)      INTERVAL HPI/OVERNIGHT EVENTS:  Patient seen awake in bed. Nurse reports patient experienced a desaturation episode earlier when trying to wean to room air. Patient denies cough or SOB at this time, denies fevers, reports feeling lightheaded when getting out of bed.     MEDICATIONS  (STANDING):  ascorbic acid 1000 milliGRAM(s) Oral daily  aspirin enteric coated 81 milliGRAM(s) Oral daily  atorvastatin 20 milliGRAM(s) Oral at bedtime  enoxaparin Injectable 40 milliGRAM(s) SubCutaneous every 12 hours  hydroxychloroquine   Oral   hydroxychloroquine 200 milliGRAM(s) Oral every 12 hours  methylPREDNISolone sodium succinate Injectable 40 milliGRAM(s) IV Push every 8 hours  metoprolol tartrate 50 milliGRAM(s) Oral two times a day    MEDICATIONS  (PRN):  acetaminophen   Tablet .. 650 milliGRAM(s) Oral every 6 hours PRN Temp greater or equal to 38C (100.4F), Mild Pain (1 - 3)  ondansetron Injectable 4 milliGRAM(s) IV Push every 6 hours PRN Nausea and/or Vomiting      Allergies    No Known Allergies    Intolerances        REVIEW OF SYSTEMS:  CONSTITUTIONAL: No fever, weight loss, or fatigue  EYES: No eye pain, visual disturbances, or discharge  ENMT:  Difficulty hearing on L side, tinnitus, vertigo; No sinus or throat pain  NECK: No pain or stiffness  BREASTS: No pain, masses, or nipple discharge  RESPIRATORY: No cough, wheezing, chills or hemoptysis; No shortness of breath  CARDIOVASCULAR: No chest pain, palpitations, lightheadedness, or leg swelling  GASTROINTESTINAL: No abdominal or epigastric pain. No nausea, vomiting, or hematemesis; No diarrhea or constipation. No melena or hematochezia.  GENITOURINARY: No dysuria, frequency, hematuria, or incontinence  NEUROLOGICAL: Lightheadedness as noted above. Weakness of L side. No headaches, memory loss, vertigo, loss of strength, numbness, or tremors  SKIN: No itching, burning, rashes, or lesions   LYMPH NODES: No enlarged glands  ENDOCRINE: No heat or cold intolerance; No hair loss; No polydipsia or polyuria  MUSCULOSKELETAL: No joint pain or swelling; No muscle, back, or extremity pain  PSYCHIATRIC: No depression, anxiety, or mood swings  HEME/LYMPH: No easy bruising, or bleeding gums  ALLERGY AND IMMUNOLOGIC: No hives or eczema    Vital Signs Last 24 Hrs  T(C): 36.3 (07 Apr 2020 15:30), Max: 36.6 (06 Apr 2020 23:53)  T(F): 97.4 (07 Apr 2020 15:30), Max: 97.9 (06 Apr 2020 23:53)  HR: 66 (07 Apr 2020 15:30) (66 - 77)  BP: 135/83 (07 Apr 2020 15:30) (135/83 - 156/94)  BP(mean): --  RR: 19 (07 Apr 2020 15:30) (17 - 19)  SpO2: 94% (07 Apr 2020 15:30) (94% - 95%)    PHYSICAL EXAM:  GENERAL: NAD, well-groomed, well-developed  HEAD:  Atraumatic, Normocephalic  EYES: L sided ptosis, PERRLA, conjunctiva and sclera clear  ENMT: Moist mucous membranes, Good dentition, No lesions; No tonsillar erythema, exudates, or enlargement  NECK: Supple, No JVD, Normal thyroid  NERVOUS SYSTEM:  Alert & Oriented X3, Good concentration; All 4 extremities mobile, gross weakness of L side. L sided ptosis  CHEST/LUNG: Clear to auscultation bilaterally; No rales, rhonchi, wheezing, or rubs  HEART: Regular rate and rhythm; No murmurs, rubs, or gallops  ABDOMEN: Soft, Nontender, Nondistended; Bowel sounds present  EXTREMITIES:  2+ Peripheral Pulses, No clubbing, cyanosis, or edema  LYMPH: No lymphadenopathy noted  SKIN: No rashes or lesions    LABS:                        15.1   4.97  )-----------( 203      ( 07 Apr 2020 07:18 )             44.9     07 Apr 2020 07:18    133    |  100    |  18     ----------------------------<  158    4.0     |  28     |  0.90     Ca    8.8        07 Apr 2020 07:18  Mg     2.3       07 Apr 2020 07:18          CAPILLARY BLOOD GLUCOSE

## 2020-04-07 NOTE — PROGRESS NOTE ADULT - SUBJECTIVE AND OBJECTIVE BOX
Date/Time Patient Seen:  		  Referring MD:   Data Reviewed	       Patient is a 64y old  Male who presents with a chief complaint of Hypoxia (07 Apr 2020 10:41)      Subjective/HPI     PAST MEDICAL & SURGICAL HISTORY:  HTN (hypertension)  High cholesterol  No significant past surgical history        Medication list         MEDICATIONS  (STANDING):  ascorbic acid 1000 milliGRAM(s) Oral daily  aspirin enteric coated 81 milliGRAM(s) Oral daily  atorvastatin 20 milliGRAM(s) Oral at bedtime  enoxaparin Injectable 40 milliGRAM(s) SubCutaneous every 12 hours  hydroxychloroquine   Oral   hydroxychloroquine 200 milliGRAM(s) Oral every 12 hours  methylPREDNISolone sodium succinate Injectable 40 milliGRAM(s) IV Push every 8 hours  metoprolol tartrate 50 milliGRAM(s) Oral two times a day    MEDICATIONS  (PRN):  acetaminophen   Tablet .. 650 milliGRAM(s) Oral every 6 hours PRN Temp greater or equal to 38C (100.4F), Mild Pain (1 - 3)  ondansetron Injectable 4 milliGRAM(s) IV Push every 6 hours PRN Nausea and/or Vomiting         Vitals log        ICU Vital Signs Last 24 Hrs  T(C): 36.4 (07 Apr 2020 07:58), Max: 36.9 (06 Apr 2020 13:48)  T(F): 97.5 (07 Apr 2020 07:58), Max: 98.4 (06 Apr 2020 13:48)  HR: 69 (07 Apr 2020 07:58) (69 - 79)  BP: 156/94 (07 Apr 2020 07:58) (115/75 - 156/94)  BP(mean): --  ABP: --  ABP(mean): --  RR: 17 (07 Apr 2020 07:58) (17 - 18)  SpO2: 95% (07 Apr 2020 07:58) (94% - 96%)           Input and Output:  I&O's Detail      Lab Data                        15.1   4.97  )-----------( 203      ( 07 Apr 2020 07:18 )             44.9     04-07    133<L>  |  100  |  18  ----------------------------<  158<H>  4.0   |  28  |  0.90    Ca    8.8      07 Apr 2020 07:18  Mg     2.3     04-07    TPro  7.1  /  Alb  3.3  /  TBili  0.9  /  DBili  x   /  AST  37  /  ALT  39  /  AlkPhos  67  04-05            Review of Systems	      Objective     Physical Examination    heart 1s2  lung dec BS      Pertinent Lab findings & Imaging      Hammond:  NO   Adequate UO     I&O's Detail           Discussed with:     Cultures:	        Radiology

## 2020-04-08 VITALS
HEART RATE: 62 BPM | SYSTOLIC BLOOD PRESSURE: 150 MMHG | OXYGEN SATURATION: 93 % | DIASTOLIC BLOOD PRESSURE: 88 MMHG | RESPIRATION RATE: 18 BRPM | TEMPERATURE: 98 F

## 2020-04-08 PROCEDURE — 36415 COLL VENOUS BLD VENIPUNCTURE: CPT

## 2020-04-08 PROCEDURE — 86803 HEPATITIS C AB TEST: CPT

## 2020-04-08 PROCEDURE — 99285 EMERGENCY DEPT VISIT HI MDM: CPT

## 2020-04-08 PROCEDURE — 80048 BASIC METABOLIC PNL TOTAL CA: CPT

## 2020-04-08 PROCEDURE — 86140 C-REACTIVE PROTEIN: CPT

## 2020-04-08 PROCEDURE — 93005 ELECTROCARDIOGRAM TRACING: CPT

## 2020-04-08 PROCEDURE — 83690 ASSAY OF LIPASE: CPT

## 2020-04-08 PROCEDURE — 83615 LACTATE (LD) (LDH) ENZYME: CPT

## 2020-04-08 PROCEDURE — 82728 ASSAY OF FERRITIN: CPT

## 2020-04-08 PROCEDURE — 96361 HYDRATE IV INFUSION ADD-ON: CPT

## 2020-04-08 PROCEDURE — 80053 COMPREHEN METABOLIC PANEL: CPT

## 2020-04-08 PROCEDURE — 85027 COMPLETE CBC AUTOMATED: CPT

## 2020-04-08 PROCEDURE — 96374 THER/PROPH/DIAG INJ IV PUSH: CPT

## 2020-04-08 PROCEDURE — 85652 RBC SED RATE AUTOMATED: CPT

## 2020-04-08 PROCEDURE — 99239 HOSP IP/OBS DSCHRG MGMT >30: CPT

## 2020-04-08 PROCEDURE — 83735 ASSAY OF MAGNESIUM: CPT

## 2020-04-08 PROCEDURE — 71045 X-RAY EXAM CHEST 1 VIEW: CPT

## 2020-04-08 RX ADMIN — Medication 40 MILLIGRAM(S): at 08:27

## 2020-04-08 RX ADMIN — ENOXAPARIN SODIUM 40 MILLIGRAM(S): 100 INJECTION SUBCUTANEOUS at 08:27

## 2020-04-08 RX ADMIN — Medication 1000 MILLIGRAM(S): at 08:26

## 2020-04-08 RX ADMIN — Medication 81 MILLIGRAM(S): at 08:27

## 2020-04-08 RX ADMIN — Medication 50 MILLIGRAM(S): at 04:07

## 2020-04-08 RX ADMIN — Medication 1 DROP(S): at 14:40

## 2020-04-08 RX ADMIN — Medication 1 DROP(S): at 04:07

## 2020-04-08 RX ADMIN — Medication 1 DROP(S): at 09:57

## 2020-04-08 RX ADMIN — Medication 1 DROP(S): at 02:07

## 2020-04-08 RX ADMIN — Medication 200 MILLIGRAM(S): at 08:27

## 2020-04-08 NOTE — PROGRESS NOTE ADULT - ASSESSMENT
The patient is a 64 year old male with a history of HTN, HL who is admitted with fevers, weakness, shortness of breath in the setting of COVID-19.    Plan:  - PVCs and ventricular trigeminy noted on telemetry; appears improved this morning  - Continue metoprolol tartrate 50 mg bid  - Potassium and magnesium ok  - Can check echocardiogram as outpatient when improved from COVID standpoint  - On methylprednisolone  - On hydroxychloroquine  - Pulm follow-up
The patient is a 64 year old male with a history of HTN, HL who is admitted with fevers, weakness, shortness of breath in the setting of COVID-19.    Plan:  - PVCs and ventricular trigeminy noted on telemetry; appears improved this morning  - Continue metoprolol tartrate 50 mg bid  - Potassium and magnesium ok  - Can check echocardiogram as outpatient when improved from COVID standpoint  - On prednisone  - On hydroxychloroquine  - Pulm follow-up  - Saturating mid 90s on NC
65 y/o male with PMH of HTN, Dyslipidemia presented with difficulty breathing and fatigue.

## 2020-04-08 NOTE — PROGRESS NOTE ADULT - PROBLEM SELECTOR PLAN 1
HTN and covid and hypoxemia  o2 support  keep sat > 90 pct  I luis armando  self prone  oral and skin care  on Steroids and Plaquenil  supportive measures and [facility] rx regimen for BP control  mobilize as tolerated  dc planning  VS and HD reviewed  will follow
covid 19 pos from outpatient setting  tylenol prn  o2 support  keep sat > 90 pct  self prone  on plaquenil BID - on Systemic steroids - limit to 5 days total  cxr and labs reviewed  check biomarkers  I luis armando as tolerated  medical comorbidities management  supportive care  isol precs.
oxygen to keep SPO2 > 92% with continuous SPO2 monitoring,   continue contact and droplet precautions  ascorbic acid, Tylenol PRN, Ferritin, CRP, and LDH, monitor Neutrophils/Lymph ratio, trend temp & markers,   continue Hydroxychloroquine & Methylprednisolone as per current recommendations, ID following

## 2020-04-08 NOTE — PROGRESS NOTE ADULT - SUBJECTIVE AND OBJECTIVE BOX
Date/Time Patient Seen:  		  Referring MD:   Data Reviewed	       Patient is a 64y old  Male who presents with a chief complaint of Hypoxia (07 Apr 2020 18:23)      Subjective/HPI     PAST MEDICAL & SURGICAL HISTORY:  HTN (hypertension)  High cholesterol  No significant past surgical history        Medication list         MEDICATIONS  (STANDING):  artificial tears (preservative free) Ophthalmic Solution 1 Drop(s) Both EYES every 4 hours  ascorbic acid 1000 milliGRAM(s) Oral daily  aspirin enteric coated 81 milliGRAM(s) Oral daily  atorvastatin 20 milliGRAM(s) Oral at bedtime  enoxaparin Injectable 40 milliGRAM(s) SubCutaneous every 12 hours  hydroxychloroquine 200 milliGRAM(s) Oral every 12 hours  hydroxychloroquine   Oral   metoprolol tartrate 50 milliGRAM(s) Oral two times a day  predniSONE   Tablet 50 milliGRAM(s) Oral two times a day    MEDICATIONS  (PRN):  acetaminophen   Tablet .. 650 milliGRAM(s) Oral every 6 hours PRN Temp greater or equal to 38C (100.4F), Mild Pain (1 - 3)  ondansetron Injectable 4 milliGRAM(s) IV Push every 6 hours PRN Nausea and/or Vomiting         Vitals log        ICU Vital Signs Last 24 Hrs  T(C): 36.4 (08 Apr 2020 08:12), Max: 36.4 (07 Apr 2020 23:59)  T(F): 97.5 (08 Apr 2020 08:12), Max: 97.5 (07 Apr 2020 23:59)  HR: 62 (08 Apr 2020 08:12) (62 - 75)  BP: 150/88 (08 Apr 2020 08:12) (135/83 - 155/84)  BP(mean): --  ABP: --  ABP(mean): --  RR: 18 (08 Apr 2020 08:12) (18 - 19)  SpO2: 93% (08 Apr 2020 08:12) (93% - 96%)           Input and Output:  I&O's Detail    07 Apr 2020 07:01  -  08 Apr 2020 07:00  --------------------------------------------------------  IN:    Oral Fluid: 100 mL  Total IN: 100 mL    OUT:    Voided: 1050 mL  Total OUT: 1050 mL    Total NET: -950 mL          Lab Data                        15.1   4.97  )-----------( 203      ( 07 Apr 2020 07:18 )             44.9     04-07    133<L>  |  100  |  18  ----------------------------<  158<H>  4.0   |  28  |  0.90    Ca    8.8      07 Apr 2020 07:18  Mg     2.3     04-07              Review of Systems	      Objective     Physical Examination    heart s1s2  lung dec BS  abd soft      Pertinent Lab findings & Imaging      Manish:  NO   Adequate UO     I&O's Detail    07 Apr 2020 07:01  -  08 Apr 2020 07:00  --------------------------------------------------------  IN:    Oral Fluid: 100 mL  Total IN: 100 mL    OUT:    Voided: 1050 mL  Total OUT: 1050 mL    Total NET: -950 mL               Discussed with:     Cultures:	        Radiology

## 2020-04-08 NOTE — PROGRESS NOTE ADULT - SUBJECTIVE AND OBJECTIVE BOX
Chief Complaint: Shortness of breath    Interval Events: No events overnight.    Review of Systems:  General: No fevers, chills, weight loss or gain  Skin: No rashes, color changes  Cardiovascular: No chest pain, orthopnea  Respiratory: No shortness of breath, cough  Gastrointestinal: No nausea, abdominal pain  Genitourinary: No incontinence, pain with urination  Musculoskeletal: No pain, swelling, decreased range of motion  Neurological: No headache, weakness  Psychiatric: No depression, anxiety  Endocrine: No weight loss or gain, increased thirst  All other systems are comprehensively negative.    Physical Exam:  Vital Signs Last 24 Hrs  T(C): 36.4 (08 Apr 2020 08:12), Max: 36.4 (07 Apr 2020 23:59)  T(F): 97.5 (08 Apr 2020 08:12), Max: 97.5 (07 Apr 2020 23:59)  HR: 62 (08 Apr 2020 08:12) (62 - 75)  BP: 150/88 (08 Apr 2020 08:12) (135/83 - 155/84)  BP(mean): --  RR: 18 (08 Apr 2020 08:12) (18 - 19)  SpO2: 93% (08 Apr 2020 08:12) (93% - 96%)  General: NAD  HEENT: MMM  Neck: No JVD, no carotid bruit  Lungs: CTAB  CV: RRR, nl S1/S2, no M/R/G  Abdomen: S/NT/ND, +BS  Extremities: No LE edema, no cyanosis  Neuro: AAOx3, non-focal  Skin: No rash    Labs:             04-07    133<L>  |  100  |  18  ----------------------------<  158<H>  4.0   |  28  |  0.90    Ca    8.8      07 Apr 2020 07:18  Mg     2.3     04-07                          15.1   4.97  )-----------( 203      ( 07 Apr 2020 07:18 )             44.9       Telemetry: Sinus rhythm, PVCs

## 2020-04-14 ENCOUNTER — APPOINTMENT (OUTPATIENT)
Dept: FAMILY MEDICINE | Facility: CLINIC | Age: 65
End: 2020-04-14
Payer: COMMERCIAL

## 2020-04-14 PROBLEM — I10 ESSENTIAL (PRIMARY) HYPERTENSION: Chronic | Status: ACTIVE | Noted: 2020-04-05

## 2020-04-14 PROBLEM — E78.00 PURE HYPERCHOLESTEROLEMIA, UNSPECIFIED: Chronic | Status: ACTIVE | Noted: 2020-04-05

## 2020-04-14 PROCEDURE — 99213 OFFICE O/P EST LOW 20 MIN: CPT | Mod: 95

## 2020-04-14 NOTE — REVIEW OF SYSTEMS
[Fever] : no fever [Chills] : no chills [Fatigue] : fatigue [Shortness Of Breath] : no shortness of breath [Cough] : no cough [Diarrhea] : no diarrhea [Dizziness] : no dizziness

## 2020-04-14 NOTE — HISTORY OF PRESENT ILLNESS
[Home] : at home, [unfilled] , at the time of the visit. [Medical Office: ([facility])___] : at the medical office located in  [Patient] : the patient [FreeTextEntry8] : was  in [facility] with [facility]  was discharged  1 wk  ago went home in o2  but not using it  o2  sats  over 90  was  in hosopital  for  4  days  no  cough no temmp smell and taste  bad no more diarrhea lost  20 lbs was ill for  2 wks before going to hospital  was  treated  with placquinal and steroids took plaquinal for  5 days.  pt  advised to have other family members in house hold  tested if they get sx  wife also had covid  pt advised to slowly become more active to call if any change in condition  pt advised he is no longer contagious and to dc o2 but check pulse ox daily and call if any change incondition

## 2020-04-19 ENCOUNTER — RX RENEWAL (OUTPATIENT)
Age: 65
End: 2020-04-19

## 2020-04-22 LAB — SARS-COV RNA ISLT QL NAA+PROBE: NORMAL

## 2020-05-21 ENCOUNTER — APPOINTMENT (OUTPATIENT)
Dept: FAMILY MEDICINE | Facility: CLINIC | Age: 65
End: 2020-05-21
Payer: COMMERCIAL

## 2020-05-21 VITALS — SYSTOLIC BLOOD PRESSURE: 160 MMHG | DIASTOLIC BLOOD PRESSURE: 90 MMHG | RESPIRATION RATE: 16 BRPM | HEART RATE: 72 BPM

## 2020-05-21 PROCEDURE — 99214 OFFICE O/P EST MOD 30 MIN: CPT

## 2020-05-21 NOTE — HISTORY OF PRESENT ILLNESS
[Admitted on: ___] : The patient was admitted on [unfilled] [Medical Office: ([facility])___] : at the medical office located in  [Discharged on ___] : discharged on [unfilled] [No] : no difficulty breathing [Anosmia] : anosmia [None] : none [Yes] : yes [N/A] : N/A [FreeTextEntry2] : pt diagnosed with covid 3/25/20  was  admitted to hospital [place]  for 4  days was discharged  was   working in [employer] wife also had  covid

## 2020-05-21 NOTE — REVIEW OF SYSTEMS
[Negative] : Gastrointestinal [Chest Pain] : no chest pain [Fever] : no fever [Shortness Of Breath] : no shortness of breath [Cough] : no cough [Skin Rash] : no skin rash

## 2020-05-21 NOTE — PHYSICAL EXAM
[Normal Oropharynx] : the oropharynx was normal [No Acute Distress] : no acute distress [PERRL] : pupils equal round and reactive to light [No Lymphadenopathy] : no lymphadenopathy [Clear to Auscultation] : lungs were clear to auscultation bilaterally [No Edema] : there was no peripheral edema [Regular Rhythm] : with a regular rhythm [Soft] : abdomen soft [Non Tender] : non-tender [Normal Insight/Judgement] : insight and judgment were intact [No HSM] : no HSM [Normal Gait] : normal gait [No Skin Lesions] : no skin lesions

## 2020-05-24 LAB
SARS-COV-2 IGG SERPL IA-ACNC: 21.1 INDEX
SARS-COV-2 IGG SERPL QL IA: POSITIVE

## 2020-05-26 ENCOUNTER — TRANSCRIPTION ENCOUNTER (OUTPATIENT)
Age: 65
End: 2020-05-26

## 2020-12-16 PROBLEM — Z87.09 HISTORY OF INFLUENZA: Status: RESOLVED | Noted: 2018-02-07 | Resolved: 2020-12-16

## 2020-12-16 PROBLEM — Z87.09 HISTORY OF ACUTE PHARYNGITIS: Status: RESOLVED | Noted: 2018-11-05 | Resolved: 2020-12-16

## 2021-03-18 ENCOUNTER — APPOINTMENT (OUTPATIENT)
Dept: FAMILY MEDICINE | Facility: CLINIC | Age: 66
End: 2021-03-18

## 2021-05-18 ENCOUNTER — RX RENEWAL (OUTPATIENT)
Age: 66
End: 2021-05-18

## 2021-05-27 ENCOUNTER — APPOINTMENT (OUTPATIENT)
Dept: FAMILY MEDICINE | Facility: CLINIC | Age: 66
End: 2021-05-27
Payer: COMMERCIAL

## 2021-05-27 VITALS — SYSTOLIC BLOOD PRESSURE: 150 MMHG | HEART RATE: 72 BPM | RESPIRATION RATE: 16 BRPM | DIASTOLIC BLOOD PRESSURE: 90 MMHG

## 2021-05-27 VITALS
DIASTOLIC BLOOD PRESSURE: 80 MMHG | WEIGHT: 234 LBS | HEIGHT: 70 IN | BODY MASS INDEX: 33.5 KG/M2 | OXYGEN SATURATION: 98 % | TEMPERATURE: 97.3 F | SYSTOLIC BLOOD PRESSURE: 130 MMHG | HEART RATE: 69 BPM

## 2021-05-27 PROCEDURE — 99072 ADDL SUPL MATRL&STAF TM PHE: CPT

## 2021-05-27 PROCEDURE — 99214 OFFICE O/P EST MOD 30 MIN: CPT | Mod: 25

## 2021-05-27 PROCEDURE — 96127 BRIEF EMOTIONAL/BEHAV ASSMT: CPT

## 2021-05-27 NOTE — HISTORY OF PRESENT ILLNESS
[Hyperlipidemia] : Hyperlipidemia [Hypertension] : Hypertension [Does not check BP] : The patient is not checking blood pressure [Doing Well] : Patient is doing well [Managed with medications] : managed with  medication [Moderate Intensity Therapy] : Patient is currently on moderate intensity statin  therapy [FreeTextEntry6] : pt had  covid last  yr doing well no sequela

## 2021-05-27 NOTE — REVIEW OF SYSTEMS
[Fatigue] : no fatigue [Chest Pain] : no chest pain [Shortness Of Breath] : no shortness of breath [Dyspnea on Exertion] : not dyspnea on exertion [Constipation] : no constipation [Diarrhea] : no diarrhea

## 2021-05-27 NOTE — PHYSICAL EXAM
[No Acute Distress] : no acute distress [PERRL] : pupils equal round and reactive to light [Normal TMs] : both tympanic membranes were normal [Supple] : supple [Clear to Auscultation] : lungs were clear to auscultation bilaterally [Normal S1, S2] : normal S1 and S2 [Normal] : soft, non-tender, non-distended, no masses palpated, no HSM and normal bowel sounds [No Edema] : there was no peripheral edema [Normal Supraclavicular Nodes] : no supraclavicular lymphadenopathy [Normal Posterior Cervical Nodes] : no posterior cervical lymphadenopathy [Normal Anterior Cervical Nodes] : no anterior cervical lymphadenopathy [No CVA Tenderness] : no CVA  tenderness [No Joint Swelling] : no joint swelling [No Rash] : no rash [No Focal Deficits] : no focal deficits [Normal Affect] : the affect was normal

## 2021-05-27 NOTE — ASSESSMENT
[FreeTextEntry1] : bp borderline discussed  low  salt  diet  exercise  and  weight loss  stress reduction for  BP management\par hld  lab evaluation\par rtc  3 mos

## 2021-05-28 ENCOUNTER — TRANSCRIPTION ENCOUNTER (OUTPATIENT)
Age: 66
End: 2021-05-28

## 2021-05-28 LAB
ALBUMIN SERPL ELPH-MCNC: 4.2 G/DL
ALP BLD-CCNC: 72 U/L
ALT SERPL-CCNC: 8 U/L
ANION GAP SERPL CALC-SCNC: 11 MMOL/L
APPEARANCE: CLEAR
AST SERPL-CCNC: 14 U/L
BACTERIA: NEGATIVE
BASOPHILS # BLD AUTO: 0.05 K/UL
BASOPHILS NFR BLD AUTO: 0.6 %
BILIRUB SERPL-MCNC: 0.6 MG/DL
BILIRUBIN URINE: NEGATIVE
BLOOD URINE: ABNORMAL
BUN SERPL-MCNC: 19 MG/DL
CALCIUM SERPL-MCNC: 9.7 MG/DL
CHLORIDE SERPL-SCNC: 106 MMOL/L
CHOLEST SERPL-MCNC: 130 MG/DL
CO2 SERPL-SCNC: 25 MMOL/L
COLOR: YELLOW
CREAT SERPL-MCNC: 0.96 MG/DL
CREAT SPEC-SCNC: 222 MG/DL
EOSINOPHIL # BLD AUTO: 0.2 K/UL
EOSINOPHIL NFR BLD AUTO: 2.6 %
ESTIMATED AVERAGE GLUCOSE: 117 MG/DL
GLUCOSE QUALITATIVE U: NEGATIVE
GLUCOSE SERPL-MCNC: 91 MG/DL
HBA1C MFR BLD HPLC: 5.7 %
HCT VFR BLD CALC: 48.4 %
HDLC SERPL-MCNC: 37 MG/DL
HGB BLD-MCNC: 15.5 G/DL
HYALINE CASTS: 0 /LPF
IMM GRANULOCYTES NFR BLD AUTO: 0.3 %
KETONES URINE: NEGATIVE
LDLC SERPL CALC-MCNC: 73 MG/DL
LEUKOCYTE ESTERASE URINE: NEGATIVE
LYMPHOCYTES # BLD AUTO: 2.12 K/UL
LYMPHOCYTES NFR BLD AUTO: 27.5 %
MAN DIFF?: NORMAL
MCHC RBC-ENTMCNC: 30 PG
MCHC RBC-ENTMCNC: 32 GM/DL
MCV RBC AUTO: 93.8 FL
MICROALBUMIN 24H UR DL<=1MG/L-MCNC: 1.5 MG/DL
MICROALBUMIN/CREAT 24H UR-RTO: 7 MG/G
MICROSCOPIC-UA: NORMAL
MONOCYTES # BLD AUTO: 0.74 K/UL
MONOCYTES NFR BLD AUTO: 9.6 %
NEUTROPHILS # BLD AUTO: 4.57 K/UL
NEUTROPHILS NFR BLD AUTO: 59.4 %
NITRITE URINE: NEGATIVE
NONHDLC SERPL-MCNC: 93 MG/DL
PH URINE: 6
PLATELET # BLD AUTO: 231 K/UL
POTASSIUM SERPL-SCNC: 4.6 MMOL/L
PROT SERPL-MCNC: 6.4 G/DL
PROTEIN URINE: NORMAL
RBC # BLD: 5.16 M/UL
RBC # FLD: 13.2 %
RED BLOOD CELLS URINE: 1 /HPF
SODIUM SERPL-SCNC: 142 MMOL/L
SPECIFIC GRAVITY URINE: 1.03
SQUAMOUS EPITHELIAL CELLS: 0 /HPF
T4 SERPL-MCNC: 5.3 UG/DL
TRIGL SERPL-MCNC: 100 MG/DL
TSH SERPL-ACNC: 0.88 UIU/ML
URATE SERPL-MCNC: 5 MG/DL
UROBILINOGEN URINE: NORMAL
WBC # FLD AUTO: 7.7 K/UL
WHITE BLOOD CELLS URINE: 1 /HPF

## 2022-09-07 ENCOUNTER — RX RENEWAL (OUTPATIENT)
Age: 67
End: 2022-09-07

## 2022-09-08 ENCOUNTER — TRANSCRIPTION ENCOUNTER (OUTPATIENT)
Age: 67
End: 2022-09-08

## 2022-10-07 ENCOUNTER — RX RENEWAL (OUTPATIENT)
Age: 67
End: 2022-10-07

## 2022-10-10 ENCOUNTER — RX RENEWAL (OUTPATIENT)
Age: 67
End: 2022-10-10

## 2022-11-14 ENCOUNTER — APPOINTMENT (OUTPATIENT)
Dept: FAMILY MEDICINE | Facility: CLINIC | Age: 67
End: 2022-11-14

## 2022-11-14 VITALS
HEART RATE: 88 BPM | WEIGHT: 239 LBS | TEMPERATURE: 96.4 F | BODY MASS INDEX: 34.22 KG/M2 | OXYGEN SATURATION: 97 % | SYSTOLIC BLOOD PRESSURE: 136 MMHG | HEIGHT: 70 IN | DIASTOLIC BLOOD PRESSURE: 94 MMHG

## 2022-11-14 VITALS — SYSTOLIC BLOOD PRESSURE: 140 MMHG | RESPIRATION RATE: 16 BRPM | DIASTOLIC BLOOD PRESSURE: 90 MMHG | HEART RATE: 72 BPM

## 2022-11-14 DIAGNOSIS — J06.9 ACUTE UPPER RESPIRATORY INFECTION, UNSPECIFIED: ICD-10-CM

## 2022-11-14 PROCEDURE — 99214 OFFICE O/P EST MOD 30 MIN: CPT

## 2022-11-14 NOTE — PHYSICAL EXAM
[No Acute Distress] : no acute distress [PERRL] : pupils equal round and reactive to light [Normal TMs] : both tympanic membranes were normal [Supple] : supple [Clear to Auscultation] : lungs were clear to auscultation bilaterally [Normal Rate] : normal rate  [No Murmur] : no murmur heard [Normal Supraclavicular Nodes] : no supraclavicular lymphadenopathy [Normal Posterior Cervical Nodes] : no posterior cervical lymphadenopathy [Normal Anterior Cervical Nodes] : no anterior cervical lymphadenopathy [Coordination Grossly Intact] : coordination grossly intact [Normal] : normal gait, coordination grossly intact, no focal deficits and deep tendon reflexes were 2+ and symmetric [Normal Insight/Judgement] : insight and judgment were intact

## 2022-11-14 NOTE — HISTORY OF PRESENT ILLNESS
[Hyperlipidemia] : Hyperlipidemia [Hypertension] : Hypertension [FreeTextEntry6] : pt c/o  sore throat loss of taste congestion no appetite o2  95  cough no temp slight sob cpovod test neg  sx started 3 days ago

## 2022-11-14 NOTE — ASSESSMENT
[FreeTextEntry1] : uri covid pcr and tessalon  htn continue losartan hld continue lovastatin check lipids

## 2022-11-15 ENCOUNTER — TRANSCRIPTION ENCOUNTER (OUTPATIENT)
Age: 67
End: 2022-11-15

## 2022-11-15 LAB
ALBUMIN SERPL ELPH-MCNC: 4.5 G/DL
ALP BLD-CCNC: 82 U/L
ALT SERPL-CCNC: 18 U/L
ANION GAP SERPL CALC-SCNC: 13 MMOL/L
APPEARANCE: CLEAR
AST SERPL-CCNC: 19 U/L
BACTERIA: NEGATIVE
BASOPHILS # BLD AUTO: 0.06 K/UL
BASOPHILS NFR BLD AUTO: 0.6 %
BILIRUB SERPL-MCNC: 0.4 MG/DL
BILIRUBIN URINE: NEGATIVE
BLOOD URINE: ABNORMAL
BUN SERPL-MCNC: 13 MG/DL
CALCIUM SERPL-MCNC: 9.5 MG/DL
CHLORIDE SERPL-SCNC: 99 MMOL/L
CHOLEST SERPL-MCNC: 118 MG/DL
CO2 SERPL-SCNC: 27 MMOL/L
COLOR: YELLOW
CREAT SERPL-MCNC: 1.2 MG/DL
EGFR: 66 ML/MIN/1.73M2
EOSINOPHIL # BLD AUTO: 0.18 K/UL
EOSINOPHIL NFR BLD AUTO: 1.8 %
ESTIMATED AVERAGE GLUCOSE: 123 MG/DL
GLUCOSE QUALITATIVE U: NEGATIVE
GLUCOSE SERPL-MCNC: 117 MG/DL
HBA1C MFR BLD HPLC: 5.9 %
HCT VFR BLD CALC: 53.8 %
HDLC SERPL-MCNC: 32 MG/DL
HGB BLD-MCNC: 17.4 G/DL
HYALINE CASTS: 0 /LPF
IMM GRANULOCYTES NFR BLD AUTO: 0.3 %
KETONES URINE: NEGATIVE
LDLC SERPL CALC-MCNC: 63 MG/DL
LEUKOCYTE ESTERASE URINE: ABNORMAL
LYMPHOCYTES # BLD AUTO: 1.8 K/UL
LYMPHOCYTES NFR BLD AUTO: 18.3 %
MAN DIFF?: NORMAL
MCHC RBC-ENTMCNC: 31.3 PG
MCHC RBC-ENTMCNC: 32.3 GM/DL
MCV RBC AUTO: 96.8 FL
MICROSCOPIC-UA: NORMAL
MONOCYTES # BLD AUTO: 1.29 K/UL
MONOCYTES NFR BLD AUTO: 13.1 %
NEUTROPHILS # BLD AUTO: 6.48 K/UL
NEUTROPHILS NFR BLD AUTO: 65.9 %
NITRITE URINE: NEGATIVE
NONHDLC SERPL-MCNC: 86 MG/DL
PH URINE: 6
PLATELET # BLD AUTO: 228 K/UL
POTASSIUM SERPL-SCNC: 4.2 MMOL/L
PROT SERPL-MCNC: 7.3 G/DL
PROTEIN URINE: ABNORMAL
PSA SERPL-MCNC: 0.81 NG/ML
RBC # BLD: 5.56 M/UL
RBC # FLD: 13.2 %
RED BLOOD CELLS URINE: 6 /HPF
SARS-COV-2 N GENE NPH QL NAA+PROBE: NOT DETECTED
SODIUM SERPL-SCNC: 139 MMOL/L
SPECIFIC GRAVITY URINE: 1.03
SQUAMOUS EPITHELIAL CELLS: 2 /HPF
T4 SERPL-MCNC: 6.4 UG/DL
TRIGL SERPL-MCNC: 118 MG/DL
TSH SERPL-ACNC: 2.12 UIU/ML
URATE SERPL-MCNC: 4.8 MG/DL
UROBILINOGEN URINE: ABNORMAL
WBC # FLD AUTO: 9.84 K/UL
WHITE BLOOD CELLS URINE: 10 /HPF

## 2022-11-16 ENCOUNTER — APPOINTMENT (OUTPATIENT)
Dept: FAMILY MEDICINE | Facility: CLINIC | Age: 67
End: 2022-11-16

## 2022-11-18 LAB
CREAT SPEC-SCNC: 402 MG/DL
MICROALBUMIN 24H UR DL<=1MG/L-MCNC: 4.8 MG/DL
MICROALBUMIN/CREAT 24H UR-RTO: 12 MG/G

## 2022-11-19 ENCOUNTER — APPOINTMENT (OUTPATIENT)
Dept: FAMILY MEDICINE | Facility: CLINIC | Age: 67
End: 2022-11-19

## 2022-11-19 VITALS
OXYGEN SATURATION: 94 % | BODY MASS INDEX: 34.22 KG/M2 | WEIGHT: 239 LBS | TEMPERATURE: 97.5 F | DIASTOLIC BLOOD PRESSURE: 80 MMHG | HEIGHT: 70 IN | HEART RATE: 94 BPM | SYSTOLIC BLOOD PRESSURE: 140 MMHG

## 2022-11-19 DIAGNOSIS — U07.1 COVID-19: ICD-10-CM

## 2022-11-19 LAB
FLUAV SPEC QL CULT: NEGATIVE
FLUBV AG SPEC QL IA: NORMAL

## 2022-11-19 PROCEDURE — 87804 INFLUENZA ASSAY W/OPTIC: CPT | Mod: QW

## 2022-11-19 PROCEDURE — 99213 OFFICE O/P EST LOW 20 MIN: CPT | Mod: 25

## 2022-11-19 NOTE — ASSESSMENT
[FreeTextEntry1] : likely with sinusitis\par rapid Flu negative\par recent COVID PCR negative\par given persistence of symptoms, will start antibiotics, take Augmentin as directed with food and probiotics\par take Prednisone as directed\par use Fluticasone nasal spray as directed\par maintain fluid hydration \par return or call if symptoms worsen or don't improve \par \par discussed recent blood work results done with PCP\par UA showed microscopic blood, patient has h/o kidney stones, advised he can see a urologist, recommend he discuss further with his PCP

## 2022-11-19 NOTE — HISTORY OF PRESENT ILLNESS
[FreeTextEntry8] : \par 67 year old male presents with his wife c/o sinus congestion, cough, fatigue x 9 days\par no fever\par no shortness of breath\par was seen by his PCP 5 days ago- COVID PCR negative\par he was prescribed Benzonatate for cough which he has been taking with mild relief

## 2022-11-19 NOTE — REVIEW OF SYSTEMS
[Cough] : cough [Fever] : no fever [Chest Pain] : no chest pain [Shortness Of Breath] : no shortness of breath [FreeTextEntry4] : as per HPI

## 2022-11-19 NOTE — PHYSICAL EXAM
[No Acute Distress] : no acute distress [Well Nourished] : well nourished [Well Developed] : well developed [Normal TMs] : both tympanic membranes were normal [No Respiratory Distress] : no respiratory distress  [Clear to Auscultation] : lungs were clear to auscultation bilaterally [Normal Rate] : normal rate  [Regular Rhythm] : with a regular rhythm [Normal S1, S2] : normal S1 and S2 [No Murmur] : no murmur heard [Normal Anterior Cervical Nodes] : no anterior cervical lymphadenopathy [Alert and Oriented x3] : oriented to person, place, and time [de-identified] : Bilateral sinus tenderness; Oropharynx with mild erythema, no exudates, postnasal drip present

## 2022-11-23 ENCOUNTER — NON-APPOINTMENT (OUTPATIENT)
Age: 67
End: 2022-11-23

## 2022-11-23 ENCOUNTER — APPOINTMENT (OUTPATIENT)
Dept: FAMILY MEDICINE | Facility: CLINIC | Age: 67
End: 2022-11-23

## 2022-11-23 VITALS
OXYGEN SATURATION: 98 % | HEIGHT: 70 IN | TEMPERATURE: 96.4 F | WEIGHT: 240 LBS | RESPIRATION RATE: 16 BRPM | DIASTOLIC BLOOD PRESSURE: 100 MMHG | BODY MASS INDEX: 34.36 KG/M2 | SYSTOLIC BLOOD PRESSURE: 156 MMHG | HEART RATE: 80 BPM

## 2022-11-23 VITALS — DIASTOLIC BLOOD PRESSURE: 90 MMHG | RESPIRATION RATE: 16 BRPM | HEART RATE: 80 BPM | SYSTOLIC BLOOD PRESSURE: 158 MMHG

## 2022-11-23 DIAGNOSIS — J01.90 ACUTE SINUSITIS, UNSPECIFIED: ICD-10-CM

## 2022-11-23 PROCEDURE — 99214 OFFICE O/P EST MOD 30 MIN: CPT | Mod: 25

## 2022-11-23 PROCEDURE — 93000 ELECTROCARDIOGRAM COMPLETE: CPT

## 2022-11-23 RX ORDER — PREDNISONE 20 MG/1
20 TABLET ORAL
Qty: 10 | Refills: 0 | Status: COMPLETED | COMMUNITY
Start: 2022-11-19 | End: 2022-11-23

## 2022-11-23 NOTE — PHYSICAL EXAM
[No Acute Distress] : no acute distress [PERRL] : pupils equal round and reactive to light [Supple] : supple [Clear to Auscultation] : lungs were clear to auscultation bilaterally [de-identified] : rr  ectopy systolic murmur

## 2022-11-23 NOTE — HISTORY OF PRESENT ILLNESS
[FreeTextEntry1] : f/u  cough  [de-identified] : cough  has  gotten  better  but  still has fatigue no temp cough non productive no diarrhea no sob

## 2022-11-23 NOTE — ASSESSMENT
[FreeTextEntry1] : bp not at goal increase metoprolol to 50 mg bid may also help with ectopy i heard bronchitis resolving hld continue lovastatin

## 2023-02-22 ENCOUNTER — APPOINTMENT (OUTPATIENT)
Dept: FAMILY MEDICINE | Facility: CLINIC | Age: 68
End: 2023-02-22
Payer: COMMERCIAL

## 2023-02-22 VITALS — RESPIRATION RATE: 16 BRPM | HEART RATE: 76 BPM | DIASTOLIC BLOOD PRESSURE: 110 MMHG | SYSTOLIC BLOOD PRESSURE: 150 MMHG

## 2023-02-22 VITALS
HEART RATE: 81 BPM | DIASTOLIC BLOOD PRESSURE: 84 MMHG | SYSTOLIC BLOOD PRESSURE: 122 MMHG | WEIGHT: 235 LBS | OXYGEN SATURATION: 97 % | TEMPERATURE: 97.2 F | BODY MASS INDEX: 33.64 KG/M2 | HEIGHT: 70 IN

## 2023-02-22 PROCEDURE — 93000 ELECTROCARDIOGRAM COMPLETE: CPT

## 2023-02-22 PROCEDURE — 99397 PER PM REEVAL EST PAT 65+ YR: CPT | Mod: 25

## 2023-02-22 RX ORDER — AMOXICILLIN AND CLAVULANATE POTASSIUM 875; 125 MG/1; MG/1
875-125 TABLET, COATED ORAL
Qty: 14 | Refills: 0 | Status: COMPLETED | COMMUNITY
Start: 2022-11-19 | End: 2023-02-22

## 2023-02-22 RX ORDER — FLUTICASONE PROPIONATE 50 UG/1
50 SPRAY, METERED NASAL DAILY
Qty: 1 | Refills: 0 | Status: COMPLETED | COMMUNITY
Start: 2022-11-19 | End: 2023-02-22

## 2023-02-22 RX ORDER — BENZONATATE 200 MG/1
200 CAPSULE ORAL 3 TIMES DAILY
Qty: 30 | Refills: 1 | Status: COMPLETED | COMMUNITY
Start: 2022-11-14 | End: 2023-02-22

## 2023-02-22 NOTE — HISTORY OF PRESENT ILLNESS
[FreeTextEntry1] : pt here  for cpe for management of HTN AND HLD  [de-identified] : PT FEELING WELL

## 2023-02-22 NOTE — HEALTH RISK ASSESSMENT
[Very Good] : ~his/her~  mood as very good [No] : No [No falls in past year] : Patient reported no falls in the past year [0] : 2) Feeling down, depressed, or hopeless: Not at all (0) [PHQ-2 Negative - No further assessment needed] : PHQ-2 Negative - No further assessment needed [None] : None [With Family] : lives with family [Employed] : employed [High School] : high school [] :  [# Of Children ___] : has [unfilled] children [Sexually Active] : sexually active [Reports changes in hearing] : Reports changes in hearing [Smoke Detector] : smoke detector [Carbon Monoxide Detector] : carbon monoxide detector [Seat Belt] :  uses seat belt [Patient/Caregiver unclear of wishes] : , patient/caregiver unclear of wishes [Time Spent: ___ minutes] : Time Spent: [unfilled] minutes [Never] : Never [de-identified] : NO  [[id number]] : 0 [Change in mental status noted] : No change in mental status noted [Language] : denies difficulty with language [Reports changes in vision] : Reports no changes in vision [Reports changes in dental health] : Reports no changes in dental health [ColonoscopyDate] : 1/07 [de-identified] : LAURA  [de-identified] : DEAF IN LEFT EAR  [AdvancecareDate] : 2/23 [FreeTextEntry4] : 16 minutes  spent discussing  end of life care and options for treatment such as, a DNR, DNI, dialysis, tube feeds and if patient becomes unable to make decisions for self and has incurable disease that treatment outweighs benefits.GIVEN INFO \par

## 2023-02-22 NOTE — PHYSICAL EXAM
[No Acute Distress] : no acute distress [PERRL] : pupils equal round and reactive to light [Normal TMs] : both tympanic membranes were normal [Supple] : supple [Clear to Auscultation] : lungs were clear to auscultation bilaterally [Regular Rhythm] : with a regular rhythm [No Edema] : there was no peripheral edema [Normal] : soft, non-tender, non-distended, no masses palpated, no HSM and normal bowel sounds [Normal Supraclavicular Nodes] : no supraclavicular lymphadenopathy [Normal Posterior Cervical Nodes] : no posterior cervical lymphadenopathy [Normal Anterior Cervical Nodes] : no anterior cervical lymphadenopathy [No Joint Swelling] : no joint swelling [No Rash] : no rash [Normal Gait] : normal gait [Normal Insight/Judgement] : insight and judgment were intact

## 2023-02-23 LAB
ALBUMIN SERPL ELPH-MCNC: 4.6 G/DL
ALP BLD-CCNC: 93 U/L
ALT SERPL-CCNC: 13 U/L
ANION GAP SERPL CALC-SCNC: 12 MMOL/L
APPEARANCE: CLEAR
AST SERPL-CCNC: 15 U/L
BACTERIA: NEGATIVE
BASOPHILS # BLD AUTO: 0.08 K/UL
BASOPHILS NFR BLD AUTO: 0.8 %
BILIRUB SERPL-MCNC: 0.5 MG/DL
BILIRUBIN URINE: NEGATIVE
BLOOD URINE: ABNORMAL
BUN SERPL-MCNC: 18 MG/DL
CALCIUM SERPL-MCNC: 10.1 MG/DL
CHLORIDE SERPL-SCNC: 102 MMOL/L
CHOLEST SERPL-MCNC: 142 MG/DL
CO2 SERPL-SCNC: 26 MMOL/L
COLOR: YELLOW
CREAT SERPL-MCNC: 1 MG/DL
CREAT SPEC-SCNC: 230 MG/DL
EGFR: 82 ML/MIN/1.73M2
EOSINOPHIL # BLD AUTO: 0.24 K/UL
EOSINOPHIL NFR BLD AUTO: 2.5 %
ESTIMATED AVERAGE GLUCOSE: 123 MG/DL
GLUCOSE QUALITATIVE U: NEGATIVE
GLUCOSE SERPL-MCNC: 93 MG/DL
HBA1C MFR BLD HPLC: 5.9 %
HCT VFR BLD CALC: 53.1 %
HDLC SERPL-MCNC: 42 MG/DL
HGB BLD-MCNC: 16.9 G/DL
HYALINE CASTS: 2 /LPF
IMM GRANULOCYTES NFR BLD AUTO: 0.2 %
KETONES URINE: NEGATIVE
LDLC SERPL CALC-MCNC: 80 MG/DL
LEUKOCYTE ESTERASE URINE: NEGATIVE
LYMPHOCYTES # BLD AUTO: 3.18 K/UL
LYMPHOCYTES NFR BLD AUTO: 33.4 %
MAN DIFF?: NORMAL
MCHC RBC-ENTMCNC: 31.1 PG
MCHC RBC-ENTMCNC: 31.8 GM/DL
MCV RBC AUTO: 97.6 FL
MICROALBUMIN 24H UR DL<=1MG/L-MCNC: 3.3 MG/DL
MICROALBUMIN/CREAT 24H UR-RTO: 14 MG/G
MICROSCOPIC-UA: NORMAL
MONOCYTES # BLD AUTO: 0.69 K/UL
MONOCYTES NFR BLD AUTO: 7.3 %
NEUTROPHILS # BLD AUTO: 5.3 K/UL
NEUTROPHILS NFR BLD AUTO: 55.8 %
NITRITE URINE: NEGATIVE
NONHDLC SERPL-MCNC: 100 MG/DL
PH URINE: 5.5
PLATELET # BLD AUTO: 245 K/UL
POTASSIUM SERPL-SCNC: 4.7 MMOL/L
PROT SERPL-MCNC: 7.3 G/DL
PROTEIN URINE: ABNORMAL
RBC # BLD: 5.44 M/UL
RBC # FLD: 13.5 %
RED BLOOD CELLS URINE: 3 /HPF
SODIUM SERPL-SCNC: 140 MMOL/L
SPECIFIC GRAVITY URINE: 1.03
SQUAMOUS EPITHELIAL CELLS: 1 /HPF
T4 SERPL-MCNC: 5.9 UG/DL
TRIGL SERPL-MCNC: 100 MG/DL
TSH SERPL-ACNC: 2.06 UIU/ML
URATE SERPL-MCNC: 4.6 MG/DL
UROBILINOGEN URINE: NORMAL
WBC # FLD AUTO: 9.51 K/UL
WHITE BLOOD CELLS URINE: 3 /HPF

## 2023-04-03 ENCOUNTER — APPOINTMENT (OUTPATIENT)
Dept: FAMILY MEDICINE | Facility: CLINIC | Age: 68
End: 2023-04-03
Payer: COMMERCIAL

## 2023-04-03 VITALS
DIASTOLIC BLOOD PRESSURE: 70 MMHG | HEIGHT: 70 IN | HEART RATE: 61 BPM | WEIGHT: 234 LBS | TEMPERATURE: 97 F | BODY MASS INDEX: 33.5 KG/M2 | OXYGEN SATURATION: 97 % | SYSTOLIC BLOOD PRESSURE: 134 MMHG

## 2023-04-03 VITALS — DIASTOLIC BLOOD PRESSURE: 90 MMHG | SYSTOLIC BLOOD PRESSURE: 146 MMHG | HEART RATE: 72 BPM | RESPIRATION RATE: 16 BRPM

## 2023-04-03 PROCEDURE — 99214 OFFICE O/P EST MOD 30 MIN: CPT

## 2023-04-03 NOTE — HISTORY OF PRESENT ILLNESS
[Hyperlipidemia] : Hyperlipidemia [Hypertension] : Hypertension [FreeTextEntry6] : pt here for management of HTN HLD  [Does not check] : Patient does not check blood glucose regularly [Does not check BP] : The patient is not checking blood pressure [<130/90] : Target blood pressure is  <130/90 [Target goal met] : BP target goal met [Doing Well] : Patient is doing well [Managed with medications] : managed with  medication [Moderate Intensity Therapy] : Patient is currently on moderate intensity statin  therapy [FreeTextEntry1] : f/u  cough  [de-identified] : cough  has  gotten  better  but  still has fatigue no temp cough non productive no diarrhea no sob

## 2023-04-03 NOTE — ASSESSMENT
[FreeTextEntry1] : BP BORDERLINE CONTINUE VALSARTAN AND METOPROLOL HLD CONTINUE LOVASTATIN discussed  weight loss  portion control  exercise will  continue  to monitor  wt  discussed  various diet low fat low carb   low  kamila and atkins instructed  all can be  successful de[ending on preferences at  least 150  min of  exercise  per  week at least 120 min  aerobic and 30  min  strength training  discussed  wt loss goal and ideal wt\par

## 2023-04-04 ENCOUNTER — APPOINTMENT (OUTPATIENT)
Dept: FAMILY MEDICINE | Facility: CLINIC | Age: 68
End: 2023-04-04

## 2023-11-12 ENCOUNTER — RX RENEWAL (OUTPATIENT)
Age: 68
End: 2023-11-12

## 2023-11-20 ENCOUNTER — APPOINTMENT (OUTPATIENT)
Dept: DERMATOLOGY | Facility: CLINIC | Age: 68
End: 2023-11-20
Payer: COMMERCIAL

## 2023-11-20 DIAGNOSIS — D48.5 NEOPLASM OF UNCERTAIN BEHAVIOR OF SKIN: ICD-10-CM

## 2023-11-20 DIAGNOSIS — L82.1 OTHER SEBORRHEIC KERATOSIS: ICD-10-CM

## 2023-11-20 PROCEDURE — 99202 OFFICE O/P NEW SF 15 MIN: CPT | Mod: 25

## 2023-11-20 PROCEDURE — 11102 TANGNTL BX SKIN SINGLE LES: CPT

## 2023-12-08 ENCOUNTER — NON-APPOINTMENT (OUTPATIENT)
Age: 68
End: 2023-12-08

## 2024-03-29 ENCOUNTER — RX RENEWAL (OUTPATIENT)
Age: 69
End: 2024-03-29

## 2024-04-24 ENCOUNTER — TRANSCRIPTION ENCOUNTER (OUTPATIENT)
Age: 69
End: 2024-04-24

## 2024-05-20 ENCOUNTER — OFFICE (OUTPATIENT)
Dept: URBAN - METROPOLITAN AREA CLINIC 6 | Facility: CLINIC | Age: 69
Setting detail: OPHTHALMOLOGY
End: 2024-05-20
Payer: COMMERCIAL

## 2024-05-20 DIAGNOSIS — H01.004: ICD-10-CM

## 2024-05-20 DIAGNOSIS — B02.39: ICD-10-CM

## 2024-05-20 DIAGNOSIS — H16.223: ICD-10-CM

## 2024-05-20 DIAGNOSIS — H01.002: ICD-10-CM

## 2024-05-20 DIAGNOSIS — H02.402: ICD-10-CM

## 2024-05-20 DIAGNOSIS — H01.005: ICD-10-CM

## 2024-05-20 DIAGNOSIS — H01.001: ICD-10-CM

## 2024-05-20 DIAGNOSIS — H16.212: ICD-10-CM

## 2024-05-20 PROCEDURE — 99213 OFFICE O/P EST LOW 20 MIN: CPT | Performed by: OPHTHALMOLOGY

## 2024-05-20 ASSESSMENT — CONFRONTATIONAL VISUAL FIELD TEST (CVF)
OS_FINDINGS: FULL
OD_FINDINGS: FULL

## 2024-05-20 ASSESSMENT — LID POSITION - PTOSIS: OS_PTOSIS: LUL 2+

## 2024-05-20 ASSESSMENT — LID EXAM ASSESSMENTS
OS_BLEPHARITIS: 1+
OS_LAGOPHTHALMOS: 0 MM
OS_EDEMA: LUL T
OD_BLEPHARITIS: 1+

## 2024-05-21 ENCOUNTER — OFFICE (OUTPATIENT)
Dept: URBAN - METROPOLITAN AREA CLINIC 88 | Facility: CLINIC | Age: 69
Setting detail: OPHTHALMOLOGY
End: 2024-05-21
Payer: COMMERCIAL

## 2024-05-21 DIAGNOSIS — H43.813: ICD-10-CM

## 2024-05-21 DIAGNOSIS — H35.372: ICD-10-CM

## 2024-05-21 PROBLEM — H02.402 PTOSIS OF EYELID, UNSPECIFIED; LEFT EYE: Status: ACTIVE | Noted: 2024-05-20

## 2024-05-21 PROBLEM — H01.00 BLEPHARITIS; RIGHT UPPER LID, RIGHT LOWER LID, LEFT UPPER LID, LEFT LOWER LID: Status: ACTIVE | Noted: 2024-05-20

## 2024-05-21 PROBLEM — H25.11 CATARACT SENILE NUCLEAR SCLEROSIS; RIGHT EYE: Status: ACTIVE | Noted: 2024-05-21

## 2024-05-21 PROBLEM — H16.223 DRY EYE SYNDROME K SICCA ; BOTH EYES: Status: ACTIVE | Noted: 2024-05-20

## 2024-05-21 PROCEDURE — 92202 OPSCPY EXTND ON/MAC DRAW: CPT | Performed by: OPHTHALMOLOGY

## 2024-05-21 PROCEDURE — 92134 CPTRZ OPH DX IMG PST SGM RTA: CPT | Performed by: OPHTHALMOLOGY

## 2024-05-21 PROCEDURE — 92014 COMPRE OPH EXAM EST PT 1/>: CPT | Performed by: OPHTHALMOLOGY

## 2024-05-21 ASSESSMENT — CONFRONTATIONAL VISUAL FIELD TEST (CVF)
OD_FINDINGS: FULL
OS_FINDINGS: FULL

## 2024-05-21 ASSESSMENT — LID EXAM ASSESSMENTS
OS_BLEPHARITIS: 1+
OD_BLEPHARITIS: 1+
OS_LAGOPHTHALMOS: 0 MM
OS_EDEMA: LUL T

## 2024-05-21 ASSESSMENT — LID POSITION - PTOSIS: OS_PTOSIS: LUL 2+

## 2024-05-22 ENCOUNTER — APPOINTMENT (OUTPATIENT)
Dept: FAMILY MEDICINE | Facility: CLINIC | Age: 69
End: 2024-05-22
Payer: COMMERCIAL

## 2024-05-22 VITALS
TEMPERATURE: 97.7 F | HEIGHT: 70 IN | SYSTOLIC BLOOD PRESSURE: 128 MMHG | BODY MASS INDEX: 34.36 KG/M2 | HEART RATE: 60 BPM | OXYGEN SATURATION: 98 % | WEIGHT: 240 LBS | DIASTOLIC BLOOD PRESSURE: 64 MMHG

## 2024-05-22 VITALS — DIASTOLIC BLOOD PRESSURE: 90 MMHG | RESPIRATION RATE: 16 BRPM | SYSTOLIC BLOOD PRESSURE: 150 MMHG | HEART RATE: 72 BPM

## 2024-05-22 DIAGNOSIS — E78.5 HYPERLIPIDEMIA, UNSPECIFIED: ICD-10-CM

## 2024-05-22 DIAGNOSIS — I10 ESSENTIAL (PRIMARY) HYPERTENSION: ICD-10-CM

## 2024-05-22 PROCEDURE — 99214 OFFICE O/P EST MOD 30 MIN: CPT

## 2024-05-22 PROCEDURE — G2211 COMPLEX E/M VISIT ADD ON: CPT | Mod: NC,1L

## 2024-05-22 NOTE — HISTORY OF PRESENT ILLNESS
[Hyperlipidemia] : Hyperlipidemia [Hypertension] : Hypertension [FreeTextEntry6] : pt here for management of HTN HLD moving to [place]  [Does not check BP] : The patient is not checking blood pressure [<130/90] : Target blood pressure is  <130/90 [Target goal met] : BP target goal met [Doing Well] : Patient is doing well [Managed with medications] : managed with  medication [Moderate Intensity Therapy] : Patient is currently on moderate intensity statin  therapy [FreeTextEntry1] : f/u  cough  [de-identified] : cough  has  gotten  better  but  still has fatigue no temp cough non productive no diarrhea no sob

## 2024-05-22 NOTE — ASSESSMENT
[FreeTextEntry1] : bp elevated  increase valsartan to 160/25  hld continue atorvastatin lab evaluation

## 2024-05-23 LAB
ALBUMIN SERPL ELPH-MCNC: 4.5 G/DL
ALP BLD-CCNC: 70 U/L
ALT SERPL-CCNC: 9 U/L
ANION GAP SERPL CALC-SCNC: 15 MMOL/L
APPEARANCE: CLEAR
AST SERPL-CCNC: 15 U/L
BACTERIA: NEGATIVE /HPF
BASOPHILS # BLD AUTO: 0.08 K/UL
BASOPHILS NFR BLD AUTO: 0.9 %
BILIRUB SERPL-MCNC: 0.7 MG/DL
BILIRUBIN URINE: NEGATIVE
BLOOD URINE: ABNORMAL
BUN SERPL-MCNC: 19 MG/DL
CALCIUM SERPL-MCNC: 9.6 MG/DL
CAST: 1 /LPF
CHLORIDE SERPL-SCNC: 99 MMOL/L
CHOLEST SERPL-MCNC: 142 MG/DL
CO2 SERPL-SCNC: 23 MMOL/L
COLOR: YELLOW
CREAT SERPL-MCNC: 1.08 MG/DL
CREAT SPEC-SCNC: 307 MG/DL
EGFR: 74 ML/MIN/1.73M2
EOSINOPHIL # BLD AUTO: 0.25 K/UL
EOSINOPHIL NFR BLD AUTO: 2.9 %
EPITHELIAL CELLS: 1 /HPF
ESTIMATED AVERAGE GLUCOSE: 126 MG/DL
GLUCOSE QUALITATIVE U: NEGATIVE
GLUCOSE SERPL-MCNC: 106 MG/DL
HBA1C MFR BLD HPLC: 6 %
HCT VFR BLD CALC: 50.3 %
HDLC SERPL-MCNC: 41 MG/DL
HGB BLD-MCNC: 16.4 G/DL
IMM GRANULOCYTES NFR BLD AUTO: 0.2 %
KETONES URINE: NEGATIVE
LDLC SERPL CALC-MCNC: 82 MG/DL
LEUKOCYTE ESTERASE URINE: NEGATIVE
LYMPHOCYTES # BLD AUTO: 3.01 K/UL
LYMPHOCYTES NFR BLD AUTO: 35.1 %
MAN DIFF?: NORMAL
MCHC RBC-ENTMCNC: 30.7 PG
MCHC RBC-ENTMCNC: 32.6 GM/DL
MCV RBC AUTO: 94 FL
MICROALBUMIN 24H UR DL<=1MG/L-MCNC: 2.2 MG/DL
MICROALBUMIN/CREAT 24H UR-RTO: 7 MG/G
MICROSCOPIC-UA: NORMAL
MONOCYTES # BLD AUTO: 0.64 K/UL
MONOCYTES NFR BLD AUTO: 7.5 %
MUCUS: PRESENT
NEUTROPHILS # BLD AUTO: 4.58 K/UL
NEUTROPHILS NFR BLD AUTO: 53.4 %
NITRITE URINE: NEGATIVE
NONHDLC SERPL-MCNC: 102 MG/DL
PH URINE: 6
PLATELET # BLD AUTO: 262 K/UL
POTASSIUM SERPL-SCNC: 4.5 MMOL/L
PROT SERPL-MCNC: 7 G/DL
PROTEIN URINE: ABNORMAL
PSA SERPL-MCNC: 0.93 NG/ML
RBC # BLD: 5.35 M/UL
RBC # FLD: 13.1 %
RED BLOOD CELLS URINE: 4 /HPF
REVIEW: NORMAL
SODIUM SERPL-SCNC: 138 MMOL/L
SPECIFIC GRAVITY URINE: >=1.03
SPERM-LIKE CELLS: PRESENT
T4 SERPL-MCNC: 5.9 UG/DL
TRIGL SERPL-MCNC: 110 MG/DL
TSH SERPL-ACNC: 1.74 UIU/ML
URATE SERPL-MCNC: 5.4 MG/DL
UROBILINOGEN URINE: NORMAL
WBC # FLD AUTO: 8.58 K/UL
WHITE BLOOD CELLS URINE: 1 /HPF

## 2024-05-28 RX ORDER — VALSARTAN AND HYDROCHLOROTHIAZIDE 160; 25 MG/1; MG/1
160-25 TABLET, FILM COATED ORAL
Qty: 90 | Refills: 1 | Status: ACTIVE | COMMUNITY
Start: 2023-02-22 | End: 1900-01-01

## 2025-02-06 ENCOUNTER — TRANSCRIPTION ENCOUNTER (OUTPATIENT)
Age: 70
End: 2025-02-06